# Patient Record
Sex: FEMALE | Race: WHITE | URBAN - METROPOLITAN AREA
[De-identification: names, ages, dates, MRNs, and addresses within clinical notes are randomized per-mention and may not be internally consistent; named-entity substitution may affect disease eponyms.]

---
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
## 2023-05-20 ENCOUNTER — EMERGENCY (EMERGENCY)
Facility: HOSPITAL | Age: 18
LOS: 0 days | Discharge: HOME | End: 2023-05-20
Attending: EMERGENCY MEDICINE
Payer: MEDICAID

## 2023-05-20 VITALS
RESPIRATION RATE: 18 BRPM | SYSTOLIC BLOOD PRESSURE: 114 MMHG | OXYGEN SATURATION: 99 % | HEART RATE: 90 BPM | TEMPERATURE: 99 F | DIASTOLIC BLOOD PRESSURE: 68 MMHG

## 2023-05-20 VITALS
RESPIRATION RATE: 16 BRPM | DIASTOLIC BLOOD PRESSURE: 70 MMHG | HEART RATE: 66 BPM | SYSTOLIC BLOOD PRESSURE: 107 MMHG | OXYGEN SATURATION: 99 % | TEMPERATURE: 96 F

## 2023-05-20 DIAGNOSIS — R11.0 NAUSEA: ICD-10-CM

## 2023-05-20 DIAGNOSIS — R10.9 UNSPECIFIED ABDOMINAL PAIN: ICD-10-CM

## 2023-05-20 DIAGNOSIS — Z87.42 PERSONAL HISTORY OF OTHER DISEASES OF THE FEMALE GENITAL TRACT: ICD-10-CM

## 2023-05-20 DIAGNOSIS — N83.201 UNSPECIFIED OVARIAN CYST, RIGHT SIDE: ICD-10-CM

## 2023-05-20 LAB
ALBUMIN SERPL ELPH-MCNC: 4.3 G/DL — SIGNIFICANT CHANGE UP (ref 3.5–5.2)
ALP SERPL-CCNC: 59 U/L — SIGNIFICANT CHANGE UP (ref 30–115)
ALT FLD-CCNC: 10 U/L — LOW (ref 14–37)
ANION GAP SERPL CALC-SCNC: 10 MMOL/L — SIGNIFICANT CHANGE UP (ref 7–14)
APPEARANCE UR: CLEAR — SIGNIFICANT CHANGE UP
APTT BLD: 36.7 SEC — SIGNIFICANT CHANGE UP (ref 27–39.2)
AST SERPL-CCNC: 15 U/L — SIGNIFICANT CHANGE UP (ref 14–37)
BASOPHILS # BLD AUTO: 0.03 K/UL — SIGNIFICANT CHANGE UP (ref 0–0.2)
BASOPHILS NFR BLD AUTO: 0.3 % — SIGNIFICANT CHANGE UP (ref 0–1)
BILIRUB SERPL-MCNC: <0.2 MG/DL — SIGNIFICANT CHANGE UP (ref 0.2–1.2)
BILIRUB UR-MCNC: NEGATIVE — SIGNIFICANT CHANGE UP
BUN SERPL-MCNC: 8 MG/DL — LOW (ref 10–20)
CALCIUM SERPL-MCNC: 10.1 MG/DL — SIGNIFICANT CHANGE UP (ref 8.4–10.5)
CHLORIDE SERPL-SCNC: 103 MMOL/L — SIGNIFICANT CHANGE UP (ref 98–110)
CO2 SERPL-SCNC: 25 MMOL/L — SIGNIFICANT CHANGE UP (ref 17–32)
COLOR SPEC: YELLOW — SIGNIFICANT CHANGE UP
CREAT SERPL-MCNC: 0.7 MG/DL — SIGNIFICANT CHANGE UP (ref 0.7–1.5)
DIFF PNL FLD: ABNORMAL
EGFR: 127 ML/MIN/1.73M2 — SIGNIFICANT CHANGE UP
EOSINOPHIL # BLD AUTO: 0.09 K/UL — SIGNIFICANT CHANGE UP (ref 0–0.7)
EOSINOPHIL NFR BLD AUTO: 1 % — SIGNIFICANT CHANGE UP (ref 0–8)
GLUCOSE SERPL-MCNC: 97 MG/DL — SIGNIFICANT CHANGE UP (ref 70–99)
GLUCOSE UR QL: NEGATIVE MG/DL — SIGNIFICANT CHANGE UP
HCG SERPL QL: NEGATIVE — SIGNIFICANT CHANGE UP
HCT VFR BLD CALC: 38.4 % — SIGNIFICANT CHANGE UP (ref 37–47)
HGB BLD-MCNC: 12.7 G/DL — SIGNIFICANT CHANGE UP (ref 12–16)
IMM GRANULOCYTES NFR BLD AUTO: 0.4 % — HIGH (ref 0.1–0.3)
INR BLD: 0.89 RATIO — SIGNIFICANT CHANGE UP (ref 0.65–1.3)
KETONES UR-MCNC: NEGATIVE — SIGNIFICANT CHANGE UP
LEUKOCYTE ESTERASE UR-ACNC: NEGATIVE — SIGNIFICANT CHANGE UP
LIDOCAIN IGE QN: 16 U/L — SIGNIFICANT CHANGE UP (ref 7–60)
LYMPHOCYTES # BLD AUTO: 2.82 K/UL — SIGNIFICANT CHANGE UP (ref 1.2–3.4)
LYMPHOCYTES # BLD AUTO: 30.9 % — SIGNIFICANT CHANGE UP (ref 20.5–51.1)
MCHC RBC-ENTMCNC: 28.7 PG — SIGNIFICANT CHANGE UP (ref 27–31)
MCHC RBC-ENTMCNC: 33.1 G/DL — SIGNIFICANT CHANGE UP (ref 32–37)
MCV RBC AUTO: 86.7 FL — SIGNIFICANT CHANGE UP (ref 81–99)
MONOCYTES # BLD AUTO: 0.94 K/UL — HIGH (ref 0.1–0.6)
MONOCYTES NFR BLD AUTO: 10.3 % — HIGH (ref 1.7–9.3)
NEUTROPHILS # BLD AUTO: 5.2 K/UL — SIGNIFICANT CHANGE UP (ref 1.4–6.5)
NEUTROPHILS NFR BLD AUTO: 57.1 % — SIGNIFICANT CHANGE UP (ref 42.2–75.2)
NITRITE UR-MCNC: NEGATIVE — SIGNIFICANT CHANGE UP
NRBC # BLD: 0 /100 WBCS — SIGNIFICANT CHANGE UP (ref 0–0)
PH UR: 6 — SIGNIFICANT CHANGE UP (ref 5–8)
PLATELET # BLD AUTO: 352 K/UL — SIGNIFICANT CHANGE UP (ref 130–400)
PMV BLD: 9.8 FL — SIGNIFICANT CHANGE UP (ref 7.4–10.4)
POTASSIUM SERPL-MCNC: 4.4 MMOL/L — SIGNIFICANT CHANGE UP (ref 3.5–5)
POTASSIUM SERPL-SCNC: 4.4 MMOL/L — SIGNIFICANT CHANGE UP (ref 3.5–5)
PROT SERPL-MCNC: 6.9 G/DL — SIGNIFICANT CHANGE UP (ref 6–8)
PROT UR-MCNC: NEGATIVE MG/DL — SIGNIFICANT CHANGE UP
PROTHROM AB SERPL-ACNC: 10.1 SEC — SIGNIFICANT CHANGE UP (ref 9.95–12.87)
RBC # BLD: 4.43 M/UL — SIGNIFICANT CHANGE UP (ref 4.2–5.4)
RBC # FLD: 12.7 % — SIGNIFICANT CHANGE UP (ref 11.5–14.5)
SODIUM SERPL-SCNC: 138 MMOL/L — SIGNIFICANT CHANGE UP (ref 135–146)
SP GR SPEC: 1.02 — SIGNIFICANT CHANGE UP (ref 1.01–1.03)
UROBILINOGEN FLD QL: 0.2 MG/DL — SIGNIFICANT CHANGE UP
WBC # BLD: 9.12 K/UL — SIGNIFICANT CHANGE UP (ref 4.8–10.8)
WBC # FLD AUTO: 9.12 K/UL — SIGNIFICANT CHANGE UP (ref 4.8–10.8)

## 2023-05-20 PROCEDURE — 85730 THROMBOPLASTIN TIME PARTIAL: CPT

## 2023-05-20 PROCEDURE — 76856 US EXAM PELVIC COMPLETE: CPT | Mod: 26

## 2023-05-20 PROCEDURE — 86901 BLOOD TYPING SEROLOGIC RH(D): CPT

## 2023-05-20 PROCEDURE — 87491 CHLMYD TRACH DNA AMP PROBE: CPT

## 2023-05-20 PROCEDURE — 85025 COMPLETE CBC W/AUTO DIFF WBC: CPT

## 2023-05-20 PROCEDURE — 81001 URINALYSIS AUTO W/SCOPE: CPT

## 2023-05-20 PROCEDURE — 87591 N.GONORRHOEAE DNA AMP PROB: CPT

## 2023-05-20 PROCEDURE — 76856 US EXAM PELVIC COMPLETE: CPT

## 2023-05-20 PROCEDURE — 74177 CT ABD & PELVIS W/CONTRAST: CPT | Mod: MA

## 2023-05-20 PROCEDURE — 99285 EMERGENCY DEPT VISIT HI MDM: CPT

## 2023-05-20 PROCEDURE — 86850 RBC ANTIBODY SCREEN: CPT

## 2023-05-20 PROCEDURE — 85610 PROTHROMBIN TIME: CPT

## 2023-05-20 PROCEDURE — 87086 URINE CULTURE/COLONY COUNT: CPT

## 2023-05-20 PROCEDURE — 83690 ASSAY OF LIPASE: CPT

## 2023-05-20 PROCEDURE — 84703 CHORIONIC GONADOTROPIN ASSAY: CPT

## 2023-05-20 PROCEDURE — 36415 COLL VENOUS BLD VENIPUNCTURE: CPT

## 2023-05-20 PROCEDURE — 80053 COMPREHEN METABOLIC PANEL: CPT

## 2023-05-20 PROCEDURE — 99284 EMERGENCY DEPT VISIT MOD MDM: CPT | Mod: 25

## 2023-05-20 PROCEDURE — 96376 TX/PRO/DX INJ SAME DRUG ADON: CPT

## 2023-05-20 PROCEDURE — 96374 THER/PROPH/DIAG INJ IV PUSH: CPT | Mod: XU

## 2023-05-20 PROCEDURE — 96375 TX/PRO/DX INJ NEW DRUG ADDON: CPT

## 2023-05-20 PROCEDURE — 86900 BLOOD TYPING SEROLOGIC ABO: CPT

## 2023-05-20 PROCEDURE — 74177 CT ABD & PELVIS W/CONTRAST: CPT | Mod: 26,MA

## 2023-05-20 RX ORDER — MORPHINE SULFATE 50 MG/1
4 CAPSULE, EXTENDED RELEASE ORAL ONCE
Refills: 0 | Status: COMPLETED | OUTPATIENT
Start: 2023-05-20 | End: 2023-05-20

## 2023-05-20 RX ORDER — MORPHINE SULFATE 50 MG/1
4 CAPSULE, EXTENDED RELEASE ORAL ONCE
Refills: 0 | Status: DISCONTINUED | OUTPATIENT
Start: 2023-05-20 | End: 2023-05-20

## 2023-05-20 RX ORDER — KETOROLAC TROMETHAMINE 30 MG/ML
30 SYRINGE (ML) INJECTION ONCE
Refills: 0 | Status: DISCONTINUED | OUTPATIENT
Start: 2023-05-20 | End: 2023-05-20

## 2023-05-20 RX ORDER — ONDANSETRON 8 MG/1
4 TABLET, FILM COATED ORAL ONCE
Refills: 0 | Status: COMPLETED | OUTPATIENT
Start: 2023-05-20 | End: 2023-05-20

## 2023-05-20 RX ORDER — FAMOTIDINE 10 MG/ML
20 INJECTION INTRAVENOUS ONCE
Refills: 0 | Status: COMPLETED | OUTPATIENT
Start: 2023-05-20 | End: 2023-05-20

## 2023-05-20 RX ORDER — SODIUM CHLORIDE 9 MG/ML
1000 INJECTION INTRAMUSCULAR; INTRAVENOUS; SUBCUTANEOUS ONCE
Refills: 0 | Status: COMPLETED | OUTPATIENT
Start: 2023-05-20 | End: 2023-05-20

## 2023-05-20 RX ORDER — HYDROMORPHONE HYDROCHLORIDE 2 MG/ML
0.5 INJECTION INTRAMUSCULAR; INTRAVENOUS; SUBCUTANEOUS ONCE
Refills: 0 | Status: DISCONTINUED | OUTPATIENT
Start: 2023-05-20 | End: 2023-05-20

## 2023-05-20 RX ORDER — DIATRIZOATE MEGLUMINE 180 MG/ML
30 INJECTION, SOLUTION INTRAVESICAL ONCE
Refills: 0 | Status: COMPLETED | OUTPATIENT
Start: 2023-05-20 | End: 2023-05-20

## 2023-05-20 RX ADMIN — ONDANSETRON 4 MILLIGRAM(S): 8 TABLET, FILM COATED ORAL at 01:54

## 2023-05-20 RX ADMIN — Medication 30 MILLIGRAM(S): at 05:32

## 2023-05-20 RX ADMIN — MORPHINE SULFATE 4 MILLIGRAM(S): 50 CAPSULE, EXTENDED RELEASE ORAL at 04:36

## 2023-05-20 RX ADMIN — SODIUM CHLORIDE 2000 MILLILITER(S): 9 INJECTION INTRAMUSCULAR; INTRAVENOUS; SUBCUTANEOUS at 01:54

## 2023-05-20 RX ADMIN — MORPHINE SULFATE 4 MILLIGRAM(S): 50 CAPSULE, EXTENDED RELEASE ORAL at 07:37

## 2023-05-20 RX ADMIN — HYDROMORPHONE HYDROCHLORIDE 0.5 MILLIGRAM(S): 2 INJECTION INTRAMUSCULAR; INTRAVENOUS; SUBCUTANEOUS at 10:10

## 2023-05-20 RX ADMIN — FAMOTIDINE 20 MILLIGRAM(S): 10 INJECTION INTRAVENOUS at 01:54

## 2023-05-20 RX ADMIN — DIATRIZOATE MEGLUMINE 30 MILLILITER(S): 180 INJECTION, SOLUTION INTRAVESICAL at 01:54

## 2023-05-20 RX ADMIN — MORPHINE SULFATE 4 MILLIGRAM(S): 50 CAPSULE, EXTENDED RELEASE ORAL at 08:30

## 2023-05-20 RX ADMIN — HYDROMORPHONE HYDROCHLORIDE 0.5 MILLIGRAM(S): 2 INJECTION INTRAMUSCULAR; INTRAVENOUS; SUBCUTANEOUS at 09:45

## 2023-05-20 NOTE — ED PROVIDER NOTE - CONSIDERATION OF ADMISSION OBSERVATION
Consideration of Admission/Observation Pt with pain and we considered admission, however the pain was improving and pt was sent home.

## 2023-05-20 NOTE — ED PROVIDER NOTE - ATTENDING APP SHARED VISIT CONTRIBUTION OF CARE
Patient presents with abdominal pain that is diffuse associate with nausea no vomiting.  LMP was the beginning of the month.  Last sexual intercourse was 15 days ago.  Had some vaginal spotting today only with wiping.  Denies any fevers.  Denies any vaginal discharge.  On exam she is tender diffusely in the abdomen without any rebound or guarding.  The tenderness is mild.  Patient notes that she was admitted to our [facility] this year for possible mesenteric adenitis.

## 2023-05-20 NOTE — ED PROVIDER NOTE - OBJECTIVE STATEMENT
18-year-old female with past medical history of ovarian cyst who presented to ED with abdominal pain.  Reports that symptoms started earlier tonight; pain is in the entire abdominal area, dullness, radiating to her back, constant, and there is no alleviating or worsening factor.  Also endorses nausea, but no vomiting.  Denies fever, shortness breath, chest pain, vomiting, hematuria, dysuria, melena, hematochezia, and vaginal discharge.

## 2023-05-20 NOTE — ED ADULT NURSE NOTE - NSFALLUNIVINTERV_ED_ALL_ED
Bed/Stretcher in lowest position, wheels locked, appropriate side rails in place/Call bell, personal items and telephone in reach/Instruct patient to call for assistance before getting out of bed/chair/stretcher/Non-slip footwear applied when patient is off stretcher/[place] to call system/Physically safe environment - no spills, clutter or unnecessary equipment/Purposeful proactive rounding/Room/bathroom lighting operational, light cord in reach

## 2023-05-20 NOTE — ED PROVIDER NOTE - PHYSICAL EXAMINATION
CONSTITUTIONAL: in no apparent distress.   HEAD: Normocephalic; atraumatic.   EYES: Pupils are round and reactive, extra-ocular muscles are intact. Eyelids are normal in appearance without swelling or lesions.   ENT: Hearing is intact with good acuity to spoken voice.  Patient is speaking clearly, not muffled and airway is intact.   RESPIRATORY: No signs of respiratory distress. Lung sounds are clear in all lobes bilaterally without rales, rhonchi, or wheezes.  CARDIOVASCULAR: Regular rate and rhythm.   GI: tender to palpation in the abd area. Abdomen is soft, and without distention. Bowel sounds are present and normoactive in all four quadrants. No masses are noted.   BACK: No evidence of trauma or deformity. No CVA tenderness bilaterally. Normal ROM.   NEURO: A & O x 3. Normal speech. No focal deficit.  PSYCHOLOGICAL: Appropriate mood and affect. Good judgement and insight. CONSTITUTIONAL: in no apparent distress.   HEAD: Normocephalic; atraumatic.   EYES: Pupils are round and reactive, extra-ocular muscles are intact. Eyelids are normal in appearance without swelling or lesions.   ENT: Hearing is intact with good acuity to spoken voice.  Patient is speaking clearly, not muffled and airway is intact.   RESPIRATORY: No signs of respiratory distress. Lung sounds are clear in all lobes bilaterally without rales, rhonchi, or wheezes.  CARDIOVASCULAR: Regular rate and rhythm.   GI: tender to palpation in the abd area. Abdomen is soft, and without distention. Bowel sounds are present and normoactive in all four quadrants. No masses are noted.   PELVIC: Chaperone: PCA Celestina. External genitalia without erythema, lesion, or mass. No vaginal discharge. Dry blood noticed in the vaginal canal. No adnexal masses or tenderness. Cervix is non-tender without lesion.  BACK: No evidence of trauma or deformity. No CVA tenderness bilaterally. Normal ROM.   NEURO: A & O x 3. Normal speech. No focal deficit.  PSYCHOLOGICAL: Appropriate mood and affect. Good judgement and insight.

## 2023-05-20 NOTE — ED PROVIDER NOTE - CLINICAL SUMMARY MEDICAL DECISION MAKING FREE TEXT BOX
Pt presents with abdominal pain that started suddenly. CT scan and US Pelvis found a hemorraghic cyst. While her pt with pain requiring Morphine and Dilaudid. Felt better after Dilaudid.

## 2023-05-20 NOTE — ED PROVIDER NOTE - PROGRESS NOTE DETAILS
Pt accepted from DR. Velasquez. PT seen and examined. reports diffuse abdominal pain that started 11PM while pt was laying in bed. Hx of ovarian cyst 2 cm about one year ago. NO associated nausea, vomiting or diarrhea. Since onset the pain is slightly better. CT scan found 4 cm ovarian cyst. PT is awaiting US of Pelvis. Pt has gotten 8 mg of morphine and toradol 30 mg. Comfortable. No abdominal tenderness. Patient stating she still has pain, another dose of morphine ordered, awaiting transvaginal sono p feeling better, will dc with GYN f/u, pt agreeable with plan

## 2023-05-20 NOTE — ED PROVIDER NOTE - PATIENT PORTAL LINK FT
You can access the FollowMyHealth Patient Portal offered by [facility] by registering at the following website: [email or web address]. By joining [employer]’s FollowMyHealth portal, you will also be able to view your health information using other applications (apps) compatible with our system.

## 2023-05-22 LAB
C TRACH RRNA SPEC QL NAA+PROBE: SIGNIFICANT CHANGE UP
CULTURE RESULTS: SIGNIFICANT CHANGE UP
N GONORRHOEA RRNA SPEC QL NAA+PROBE: SIGNIFICANT CHANGE UP
SPECIMEN SOURCE: SIGNIFICANT CHANGE UP
SPECIMEN SOURCE: SIGNIFICANT CHANGE UP

## 2023-08-25 ENCOUNTER — NON-APPOINTMENT (OUTPATIENT)
Age: 18
End: 2023-08-25

## 2023-09-08 ENCOUNTER — INPATIENT (INPATIENT)
Facility: HOSPITAL | Age: 18
LOS: 4 days | Discharge: HOME | DRG: 241 | End: 2023-09-13
Attending: HOSPITALIST | Admitting: HOSPITALIST
Payer: MEDICAID

## 2023-09-08 ENCOUNTER — TRANSCRIPTION ENCOUNTER (OUTPATIENT)
Age: 18
End: 2023-09-08

## 2023-09-08 VITALS
HEART RATE: 96 BPM | RESPIRATION RATE: 18 BRPM | OXYGEN SATURATION: 99 % | TEMPERATURE: 97 F | WEIGHT: 160.06 LBS | DIASTOLIC BLOOD PRESSURE: 81 MMHG | SYSTOLIC BLOOD PRESSURE: 123 MMHG

## 2023-09-08 DIAGNOSIS — K52.839 MICROSCOPIC COLITIS, UNSPECIFIED: ICD-10-CM

## 2023-09-08 PROBLEM — Z87.42 PERSONAL HISTORY OF OTHER DISEASES OF THE FEMALE GENITAL TRACT: Chronic | Status: ACTIVE | Noted: 2023-05-20

## 2023-09-08 LAB
ALBUMIN SERPL ELPH-MCNC: 4.4 G/DL — SIGNIFICANT CHANGE UP (ref 3.5–5.2)
ALP SERPL-CCNC: 54 U/L — SIGNIFICANT CHANGE UP (ref 30–115)
ALT FLD-CCNC: 11 U/L — LOW (ref 14–37)
ANION GAP SERPL CALC-SCNC: 11 MMOL/L — SIGNIFICANT CHANGE UP (ref 7–14)
APPEARANCE UR: CLEAR — SIGNIFICANT CHANGE UP
AST SERPL-CCNC: 17 U/L — SIGNIFICANT CHANGE UP (ref 14–37)
BASOPHILS # BLD AUTO: 0.02 K/UL — SIGNIFICANT CHANGE UP (ref 0–0.2)
BASOPHILS NFR BLD AUTO: 0.3 % — SIGNIFICANT CHANGE UP (ref 0–1)
BILIRUB DIRECT SERPL-MCNC: <0.2 MG/DL — SIGNIFICANT CHANGE UP (ref 0–0.3)
BILIRUB INDIRECT FLD-MCNC: >0.1 MG/DL — LOW (ref 0.2–1.2)
BILIRUB SERPL-MCNC: 0.3 MG/DL — SIGNIFICANT CHANGE UP (ref 0.2–1.2)
BILIRUB UR-MCNC: NEGATIVE — SIGNIFICANT CHANGE UP
BUN SERPL-MCNC: 5 MG/DL — LOW (ref 10–20)
CALCIUM SERPL-MCNC: 9.3 MG/DL — SIGNIFICANT CHANGE UP (ref 8.4–10.5)
CHLORIDE SERPL-SCNC: 103 MMOL/L — SIGNIFICANT CHANGE UP (ref 98–110)
CO2 SERPL-SCNC: 24 MMOL/L — SIGNIFICANT CHANGE UP (ref 17–32)
COLOR SPEC: YELLOW — SIGNIFICANT CHANGE UP
CREAT SERPL-MCNC: 0.7 MG/DL — SIGNIFICANT CHANGE UP (ref 0.7–1.5)
DIFF PNL FLD: ABNORMAL
EGFR: 127 ML/MIN/1.73M2 — SIGNIFICANT CHANGE UP
EOSINOPHIL # BLD AUTO: 0.03 K/UL — SIGNIFICANT CHANGE UP (ref 0–0.7)
EOSINOPHIL NFR BLD AUTO: 0.4 % — SIGNIFICANT CHANGE UP (ref 0–8)
GLUCOSE SERPL-MCNC: 88 MG/DL — SIGNIFICANT CHANGE UP (ref 70–99)
GLUCOSE UR QL: NEGATIVE MG/DL — SIGNIFICANT CHANGE UP
HCG SERPL-ACNC: <1 MIU/ML — SIGNIFICANT CHANGE UP
HCT VFR BLD CALC: 39.1 % — SIGNIFICANT CHANGE UP (ref 37–47)
HGB BLD-MCNC: 12.8 G/DL — SIGNIFICANT CHANGE UP (ref 12–16)
IMM GRANULOCYTES NFR BLD AUTO: 0.3 % — SIGNIFICANT CHANGE UP (ref 0.1–0.3)
KETONES UR-MCNC: NEGATIVE MG/DL — SIGNIFICANT CHANGE UP
LEUKOCYTE ESTERASE UR-ACNC: ABNORMAL
LIDOCAIN IGE QN: 14 U/L — SIGNIFICANT CHANGE UP (ref 7–60)
LYMPHOCYTES # BLD AUTO: 2.69 K/UL — SIGNIFICANT CHANGE UP (ref 1.2–3.4)
LYMPHOCYTES # BLD AUTO: 34.9 % — SIGNIFICANT CHANGE UP (ref 20.5–51.1)
MCHC RBC-ENTMCNC: 28 PG — SIGNIFICANT CHANGE UP (ref 27–31)
MCHC RBC-ENTMCNC: 32.7 G/DL — SIGNIFICANT CHANGE UP (ref 32–37)
MCV RBC AUTO: 85.6 FL — SIGNIFICANT CHANGE UP (ref 81–99)
MONOCYTES # BLD AUTO: 0.61 K/UL — HIGH (ref 0.1–0.6)
MONOCYTES NFR BLD AUTO: 7.9 % — SIGNIFICANT CHANGE UP (ref 1.7–9.3)
NEUTROPHILS # BLD AUTO: 4.34 K/UL — SIGNIFICANT CHANGE UP (ref 1.4–6.5)
NEUTROPHILS NFR BLD AUTO: 56.2 % — SIGNIFICANT CHANGE UP (ref 42.2–75.2)
NITRITE UR-MCNC: NEGATIVE — SIGNIFICANT CHANGE UP
NRBC # BLD: 0 /100 WBCS — SIGNIFICANT CHANGE UP (ref 0–0)
PH UR: 6.5 — SIGNIFICANT CHANGE UP (ref 5–8)
PLATELET # BLD AUTO: 357 K/UL — SIGNIFICANT CHANGE UP (ref 130–400)
PMV BLD: 9.7 FL — SIGNIFICANT CHANGE UP (ref 7.4–10.4)
POTASSIUM SERPL-MCNC: 4.1 MMOL/L — SIGNIFICANT CHANGE UP (ref 3.5–5)
POTASSIUM SERPL-SCNC: 4.1 MMOL/L — SIGNIFICANT CHANGE UP (ref 3.5–5)
PROT SERPL-MCNC: 6.9 G/DL — SIGNIFICANT CHANGE UP (ref 6–8)
PROT UR-MCNC: NEGATIVE MG/DL — SIGNIFICANT CHANGE UP
RBC # BLD: 4.57 M/UL — SIGNIFICANT CHANGE UP (ref 4.2–5.4)
RBC # FLD: 12.9 % — SIGNIFICANT CHANGE UP (ref 11.5–14.5)
SODIUM SERPL-SCNC: 138 MMOL/L — SIGNIFICANT CHANGE UP (ref 135–146)
SP GR SPEC: 1 — SIGNIFICANT CHANGE UP (ref 1–1.03)
UROBILINOGEN FLD QL: 0.2 MG/DL — SIGNIFICANT CHANGE UP (ref 0.2–1)
WBC # BLD: 7.71 K/UL — SIGNIFICANT CHANGE UP (ref 4.8–10.8)
WBC # FLD AUTO: 7.71 K/UL — SIGNIFICANT CHANGE UP (ref 4.8–10.8)

## 2023-09-08 PROCEDURE — 87177 OVA AND PARASITES SMEARS: CPT

## 2023-09-08 PROCEDURE — 74177 CT ABD & PELVIS W/CONTRAST: CPT | Mod: 26,MA

## 2023-09-08 PROCEDURE — 86140 C-REACTIVE PROTEIN: CPT

## 2023-09-08 PROCEDURE — 87046 STOOL CULTR AEROBIC BACT EA: CPT

## 2023-09-08 PROCEDURE — 88312 SPECIAL STAINS GROUP 1: CPT

## 2023-09-08 PROCEDURE — 88305 TISSUE EXAM BY PATHOLOGIST: CPT

## 2023-09-08 PROCEDURE — 81025 URINE PREGNANCY TEST: CPT

## 2023-09-08 PROCEDURE — 83993 ASSAY FOR CALPROTECTIN FECAL: CPT

## 2023-09-08 PROCEDURE — 87045 FECES CULTURE AEROBIC BACT: CPT

## 2023-09-08 PROCEDURE — 87507 IADNA-DNA/RNA PROBE TQ 12-25: CPT

## 2023-09-08 PROCEDURE — 36415 COLL VENOUS BLD VENIPUNCTURE: CPT

## 2023-09-08 PROCEDURE — 85652 RBC SED RATE AUTOMATED: CPT

## 2023-09-08 PROCEDURE — 99285 EMERGENCY DEPT VISIT HI MDM: CPT

## 2023-09-08 PROCEDURE — 82270 OCCULT BLOOD FECES: CPT

## 2023-09-08 RX ORDER — SODIUM CHLORIDE 9 MG/ML
1000 INJECTION, SOLUTION INTRAVENOUS ONCE
Refills: 0 | Status: COMPLETED | OUTPATIENT
Start: 2023-09-08 | End: 2023-09-08

## 2023-09-08 RX ORDER — MORPHINE SULFATE 50 MG/1
4 CAPSULE, EXTENDED RELEASE ORAL EVERY 4 HOURS
Refills: 0 | Status: DISCONTINUED | OUTPATIENT
Start: 2023-09-08 | End: 2023-09-08

## 2023-09-08 RX ORDER — MORPHINE SULFATE 50 MG/1
4 CAPSULE, EXTENDED RELEASE ORAL ONCE
Refills: 0 | Status: DISCONTINUED | OUTPATIENT
Start: 2023-09-08 | End: 2023-09-08

## 2023-09-08 RX ORDER — SODIUM CHLORIDE 9 MG/ML
1000 INJECTION, SOLUTION INTRAVENOUS
Refills: 0 | Status: DISCONTINUED | OUTPATIENT
Start: 2023-09-08 | End: 2023-09-13

## 2023-09-08 RX ORDER — ONDANSETRON 8 MG/1
4 TABLET, FILM COATED ORAL EVERY 8 HOURS
Refills: 0 | Status: DISCONTINUED | OUTPATIENT
Start: 2023-09-08 | End: 2023-09-13

## 2023-09-08 RX ORDER — FAMOTIDINE 10 MG/ML
20 INJECTION INTRAVENOUS ONCE
Refills: 0 | Status: COMPLETED | OUTPATIENT
Start: 2023-09-08 | End: 2023-09-08

## 2023-09-08 RX ORDER — MORPHINE SULFATE 50 MG/1
6 CAPSULE, EXTENDED RELEASE ORAL ONCE
Refills: 0 | Status: DISCONTINUED | OUTPATIENT
Start: 2023-09-08 | End: 2023-09-08

## 2023-09-08 RX ORDER — ONDANSETRON 8 MG/1
4 TABLET, FILM COATED ORAL ONCE
Refills: 0 | Status: COMPLETED | OUTPATIENT
Start: 2023-09-08 | End: 2023-09-08

## 2023-09-08 RX ORDER — ACETAMINOPHEN 500 MG
650 TABLET ORAL ONCE
Refills: 0 | Status: COMPLETED | OUTPATIENT
Start: 2023-09-08 | End: 2023-09-08

## 2023-09-08 RX ORDER — IOHEXOL 300 MG/ML
30 INJECTION, SOLUTION INTRAVENOUS ONCE
Refills: 0 | Status: COMPLETED | OUTPATIENT
Start: 2023-09-08 | End: 2023-09-08

## 2023-09-08 RX ORDER — MORPHINE SULFATE 50 MG/1
6 CAPSULE, EXTENDED RELEASE ORAL EVERY 4 HOURS
Refills: 0 | Status: DISCONTINUED | OUTPATIENT
Start: 2023-09-08 | End: 2023-09-13

## 2023-09-08 RX ORDER — ACETAMINOPHEN 500 MG
650 TABLET ORAL EVERY 6 HOURS
Refills: 0 | Status: DISCONTINUED | OUTPATIENT
Start: 2023-09-08 | End: 2023-09-11

## 2023-09-08 RX ORDER — DIPHENHYDRAMINE HYDROCHLORIDE AND LIDOCAINE HYDROCHLORIDE AND ALUMINUM HYDROXIDE AND MAGNESIUM HYDRO
30 KIT ONCE
Refills: 0 | Status: COMPLETED | OUTPATIENT
Start: 2023-09-08 | End: 2023-09-08

## 2023-09-08 RX ADMIN — SODIUM CHLORIDE 1000 MILLILITER(S): 9 INJECTION, SOLUTION INTRAVENOUS at 08:54

## 2023-09-08 RX ADMIN — SODIUM CHLORIDE 100 MILLILITER(S): 9 INJECTION, SOLUTION INTRAVENOUS at 22:47

## 2023-09-08 RX ADMIN — Medication 650 MILLIGRAM(S): at 10:06

## 2023-09-08 RX ADMIN — DIPHENHYDRAMINE HYDROCHLORIDE AND LIDOCAINE HYDROCHLORIDE AND ALUMINUM HYDROXIDE AND MAGNESIUM HYDRO 30 MILLILITER(S): KIT at 09:16

## 2023-09-08 RX ADMIN — ONDANSETRON 4 MILLIGRAM(S): 8 TABLET, FILM COATED ORAL at 22:28

## 2023-09-08 RX ADMIN — MORPHINE SULFATE 4 MILLIGRAM(S): 50 CAPSULE, EXTENDED RELEASE ORAL at 11:03

## 2023-09-08 RX ADMIN — Medication 650 MILLIGRAM(S): at 23:26

## 2023-09-08 RX ADMIN — ONDANSETRON 4 MILLIGRAM(S): 8 TABLET, FILM COATED ORAL at 09:54

## 2023-09-08 RX ADMIN — MORPHINE SULFATE 4 MILLIGRAM(S): 50 CAPSULE, EXTENDED RELEASE ORAL at 18:33

## 2023-09-08 RX ADMIN — MORPHINE SULFATE 6 MILLIGRAM(S): 50 CAPSULE, EXTENDED RELEASE ORAL at 23:16

## 2023-09-08 RX ADMIN — IOHEXOL 30 MILLILITER(S): 300 INJECTION, SOLUTION INTRAVENOUS at 11:03

## 2023-09-08 RX ADMIN — MORPHINE SULFATE 6 MILLIGRAM(S): 50 CAPSULE, EXTENDED RELEASE ORAL at 23:14

## 2023-09-08 RX ADMIN — SODIUM CHLORIDE 100 MILLILITER(S): 9 INJECTION, SOLUTION INTRAVENOUS at 18:20

## 2023-09-08 RX ADMIN — Medication 650 MILLIGRAM(S): at 23:22

## 2023-09-08 RX ADMIN — FAMOTIDINE 20 MILLIGRAM(S): 10 INJECTION INTRAVENOUS at 08:54

## 2023-09-08 RX ADMIN — MORPHINE SULFATE 6 MILLIGRAM(S): 50 CAPSULE, EXTENDED RELEASE ORAL at 12:46

## 2023-09-08 NOTE — ED PROVIDER NOTE - ATTENDING CONTRIBUTION TO CARE
18-year-old female presenting for evaluation of epigastric pain associated with nausea vomiting since yesterday 1 PM.  Last meal at 1 PM yesterday, patient had waffles.  Symptoms similar to prior episodes, patient was hospitalized in February for similar complaints at [facility], was diagnosed with gastritis.  At that time had labs and reportedly CT abdomen pelvis.  Her PCP prescribed her Pepcid and omeprazole, however, she takes it only occasionally, last use 2 weeks ago.  Denies any excessive alcohol or any NSAID use. Patient reports that she saw blood streaks in her emesis, and had small amount of blood on tissue paper after having a bowel movement yesterday.  Denies any pain in her rectum, no excessive vaginal bleeding, no dysuria/hematuria urgency or frequency.  Denies any dizziness lightheadedness, chest pain or shortness of breath, flank pain, focal weakness or paresthesias. Patient has a gastroenterology appointment in 3 days.  Well-appearing well-nourished, NAD, head AT/NC, PERRL, pink conjunctivae,  mmm, nml oropharynx, nml phonation without drooling or trismus, supple neck , nml work of breathing, lungs CTA b/l, equal air entry, RRR, well-perfused extremities, distal pulses intact, abdomen soft, epigastric TTP without rebound or guarding, ND, BS present in all quadrants, MOISES done by me and chaperoned by Dr. Jamison is negative for hemorrhoids/fissures/no blood on glove, no tenderness on exam,no midline spine or CVA ttp, no leg edema or unilateral calf swelling, A&Ox3, no focal neuro deficits, nml mood and affect.  Plan: Antiemetics, Pepcid, hydration, labs, reassess.

## 2023-09-08 NOTE — DISCHARGE NOTE PROVIDER - PROVIDER TOKENS
PROVIDER:[TOKEN:[[id number]],FOLLOWUP:[1-3 days]] PROVIDER:[TOKEN:[[id number]],FOLLOWUP:[1-3 days]],PROVIDER:[TOKEN:[[id number]],FOLLOWUP:[2 weeks]]

## 2023-09-08 NOTE — ED PROVIDER NOTE - OBJECTIVE STATEMENT
18 y F w/ a hx of right ovarian cyst and mesenteric ileitis presents for abdominal pain and bloody stools. Pt also states she experienced 4 bouts of bloody emesis. Since yesterday afternoon, she had an upset stomach and did not eat since, but has been able to tolerate water. Last night, she had sudden onset constant diffuse 8/10 abdominal pain for which she didn't take any medications, but movements and eating exacerbate the pain. She had similar experiences of abdominal pain this past February and April or May. She had to be hospitalized for mesenteric ileitis and was discharged with GI follow up, who she hadn't had the opportunity to see. Her appointment is this following Monday. Her PCP prescribed Pepcid and Omeprazole, which she takes intermittently when she has stomach problems. Last night she experienced 1 episode of blood after she wiped. A second bowel movement yielded no blood. Secondary to pain she felt nauseous and vomited liquid blood 1x, and afterwards bloody phlegm. She denies fever, chills, dizziness, lightheadedness, chest pain, shortness of breath, diarrhea, constipation, or urinary symptoms. 18 y F w/ a hx of right ovarian cyst, mesenteric ileitis and gastritis presents for abdominal pain and bloody stools. Pt also states she experienced 4 bouts of bloody emesis. Since yesterday afternoon, she had an upset stomach and did not eat since, but has been able to tolerate water. Last night, she had sudden onset constant diffuse 8/10 abdominal pain for which she didn't take any medications, but movements and eating exacerbate the pain. She had similar experiences of abdominal pain this past February and April or May. She had to be hospitalized for mesenteric ileitis and was discharged with GI follow up, who she hadn't had the opportunity to see. Her appointment is this following Monday. Her PCP prescribed Pepcid and Omeprazole for newly diagnosed gastritis, which she takes intermittently when she has stomach problems. Last night she experienced 1 episode of blood after she wiped. A second bowel movement yielded no blood. Secondary to pain she felt nauseous and vomited liquid blood 1x, and afterwards bloody phlegm. She denies fever, chills, dizziness, lightheadedness, chest pain, shortness of breath, diarrhea, constipation, or urinary symptoms.

## 2023-09-08 NOTE — DISCHARGE NOTE PROVIDER - CARE PROVIDERS DIRECT ADDRESSES
[email or web address]

## 2023-09-08 NOTE — H&P PEDIATRIC - ATTENDING COMMENTS
Patient examined on morning rounds and I performed my own assessment.     Gabi is a 18 you F admitted for recurrent episodes of abdominal pain, now with intermittent areas of ileitis/colitis on her CT, persistent pain and PO intolerance, elevated inflammatory markers (ESR, CRP) that is most concerning for an inflammatory process and presentation and CT findings can be consistent with inflammatory bowel disease. Symptoms have likely been further exacerbated by her use of Advil.     On exam, she is overall well-appearing but does appear to be in mild discomfort, pleasant and cooperative, MMM, clear oropharynx, PERRL, clear lungs and breathing comfortably, normal cardiac exam, no murmurs rubs or gallops; on abdominal exam she is very tender throughout, even to light palpation, worse in lower quadrants, no HSM, normaoactive BS. She has no bruises rashes or other skin lesions, pulses 2+ bilat, WWP, normal cap refill.     We will continue with IV hydration while PO remains poor, pain control with IV tylenol and morphine (no NSAIDs due to risk of GI irritation), and as per adult GI consult recommendations started Cipro/flagyl today. If little to no response, they will consider endoscopy for diagnosis in 2 days (monday). Dispo pending improvement in symptoms.    I have discussed plan of care with multidisciplinary team, patient and family. All questions addressed.

## 2023-09-08 NOTE — ED PEDIATRIC TRIAGE NOTE - BP NONINVASIVE SYSTOLIC (MM HG)
Spoke with Adelina in response to her portal message with c/o gas pain. The pt states that for the past few days her \"colon feels off\" and that if feels like there is a pain \"like a gas bubble\" that starts at her stomach and that travels to her rectum and that it is relieved when she passes gas, but that she struggles to pass gas. She reports soft and formed stool and regular bm. She denies blood at the rectum or in the stools. She states that she does not want to take gas X , as she took it before and \"did not like how it felt\". She states that she had this issue with her last pregnancy at that it was helped by seeing a physiotherapist and she is requesting a referral for this service again. I reviewed all of the above with DINA Leong and an order was placed for PT for possible pelvic prolapse. The phone number was provided for women's health rehab for the pt to schedule an appt.    [phone]

## 2023-09-08 NOTE — ED PROVIDER NOTE - DIFFERENTIAL DIAGNOSIS
Differential Diagnosis Gastritis, peptic ulcer disease, pancreatitis, enteritis, less likely cholecystitis or cholelithiasis given negative CT a few months ago

## 2023-09-08 NOTE — PATIENT PROFILE ADULT - FALL HARM RISK - UNIVERSAL INTERVENTIONS
Bed in lowest position, wheels locked, appropriate side rails in place/Call bell, personal items and telephone in reach/Instruct patient to call for assistance before getting out of bed or chair/Non-slip footwear when patient is out of bed/Leonidas to call system/Physically safe environment - no spills, clutter or unnecessary equipment/Purposeful Proactive Rounding/Room/bathroom lighting operational, light cord in reach

## 2023-09-08 NOTE — DISCHARGE NOTE PROVIDER - NSDCCPCAREPLAN_GEN_ALL_CORE_FT
PRINCIPAL DISCHARGE DIAGNOSIS  Diagnosis: Gastritis  Assessment and Plan of Treatment: Contact a health care provider if:  Your child's condition gets worse.  Your child loses weight or has no appetite.  Your child is nauseous and vomits.  Your child has a fever.  Your child has blood in his or her vomit or stool.  .  Get help right away if:  Your child vomits red blood or a substance that looks like coffee grounds.  Your child is light-headed or faints.  Your child has bright red or black and tarry stools.  Your child vomits repeatedly.  Your child has severe pain in his or her abdomen, or the abdomen is tender to the touch.  Your child has chest pain or shortness of breath.  Your child who is younger than 3 months has a temperature of 100.4°F (38°C) or higher.  Your child who is 3 months to 3 years old has a temperature of 102.2°F (39°C) or higher.  These symptoms may represent a serious problem that is an emergency. Do not wait to see if the symptoms will go away. Get medical help right away. Call your local emergency services (911 in the [place]      SECONDARY DISCHARGE DIAGNOSES  Diagnosis: Abdominal pain  Assessment and Plan of Treatment:      PRINCIPAL DISCHARGE DIAGNOSIS  Diagnosis: Gastritis  Assessment and Plan of Treatment: Discharge Plan:  - Follow up with pediatrician ( Dr. Castillo) in 1-3 days  - Follow up with gastroenterologist ( Dr. Montana) in 2-3 weeks. ( please call to book your appointment)  - Gastroenterologist to follow up on colonoscopy biopsy results   - Medication Instructions  - May take bentyl 1 tablet every 6 hours as needed for abdominal pain  - Take protonix 1 tablet by once daily for 11 days  > Continue home medications:   - Take lexapro 1 tablet by mouth once daily   .  Contact a health care provider if:  Your child's condition gets worse.  Your child loses weight or has no appetite.  Your child is nauseous and vomits.  Your child has a fever.  Your child has blood in his or her vomit or stool.  .  Get help right away if:  Your child vomits red blood or a substance that looks like coffee grounds.  Your child is light-headed or faints.  Your child has bright red or black and tarry stools.  Your child vomits repeatedly.  Your child has severe pain in his or her abdomen, or the abdomen is tender to the touch.  Your child has chest pain or shortness of breath.  Your child who is younger than 3 months has a temperature of 100.4°F (38°C) or higher.  Your child who is 3 months to 3 years old has a temperature of 102.2°F (39°C) or higher.  These symptoms may represent a serious problem that is an emergency. Do not wait to see if the symptoms will go away. Get medical help right away. Call your local emergency services (911 in the [place]      SECONDARY DISCHARGE DIAGNOSES  Diagnosis: Abdominal pain  Assessment and Plan of Treatment:

## 2023-09-08 NOTE — ED PEDIATRIC NURSE NOTE - NSNEUBEH_NEU_P_CORE
Please notify pt her pap smear is normal.    Dr Patino  Please notify pt there were signs of a yeast infection on her Pap Test.  If she is having symptoms please give her an Rx for Diflucan 200 mgx1.     no

## 2023-09-08 NOTE — ED PROVIDER NOTE - CLINICAL SUMMARY MEDICAL DECISION MAKING FREE TEXT BOX
18-year-old female with history of some streaks of blood in her emesis, with epigastric abdominal pain, seen at [facility] a few months ago with a CT that was performed and patient was advised to follow-up with GI, who with whom she has an appointment in 3 days.  Patient was also prescribed Pepcid, which she has not taken regularly.  Patient is pending medications, labs, and reevaluation. Patient still in pain after Tylenol.  Pain located in the epigastric area and periumbilical.  Patient agreeable to morphine.  Plan to perform a CT scan to evaluate for intra-abdominal pathology. LMP ended yesterday. Labs reviewed. CT reviewed.  Patient with severe abdominal pain, requiring multiple doses of morphine.  Patient admitted to pediatric floor for pain management.

## 2023-09-08 NOTE — DISCHARGE NOTE PROVIDER - HOSPITAL COURSE
One Liner: 19 yo with intermittent abdominal pain requiring pain control presents with diffuse abdominal pain admitted for pain control.       ED Course: CBC, BMP, Hepatic function, HCG, lipase, ESR, UA, LR bolus, Pepcid, Morphine x2, Tylenol    Inpatient Course (9/8-:   Pt was admitted to the inpatient floor and ___.     Labs and Radiology:    Discharge Vitals and Physical Exam:      Vitals and clinical status stable on discharge.     Discharge Plan:  - Follow up with pediatrician in 1-3 days  - Medication Instructions  >     One Liner: 17 yo with intermittent abdominal pain requiring pain control presents with diffuse abdominal pain admitted for pain control.       ED Course: CBC, BMP, Hepatic function, HCG, lipase, ESR, UA, LR bolus, Pepcid, Morphine x2, Tylenol    Inpatient Course (9/8-:   Pt was admitted to the inpatient floor.  RESP: Patient was stable on RA.  CVS: Patient was hemodynamically stable.  FENGI: Patient tolerated a regular diet. Adult GI was consulted who requested stool studies which were collected on _____. The results were____. Patient was scoped on___.  PAIN: Patient received 6 mg of Morphine as needed.     Labs and Radiology:  C-Reactive Protein, Serum (09.08.23 @ 22:56)    C-Reactive Protein, Serum: 4.3 mg/L    Sedimentation Rate, Erythrocyte (09.08.23 @ 22:56)    Sedimentation Rate, Erythrocyte: 40 mm/Hr    HCG Quantitative, Serum (09.08.23 @ 08:50)    HCG Quantitative, Serum: <1.0: HCG-Quantitative test interpretations: This test is intended only for the  detection & monitoring of pregnancy. For oncology studies order the tumor  marker test "HCG-TM" (hCG-Tumor Marker).  For pregnancy evaluation the reference values are as follows:  Negative:  <=4 mIU/mL      Discharge Vitals and Physical Exam:      Vitals and clinical status stable on discharge.     Discharge Plan:  - Follow up with pediatrician in 1-3 days  - Medication Instructions  >     One Liner: 19 yo with intermittent abdominal pain requiring pain control presents with diffuse abdominal pain admitted for pain control.       ED Course: CBC, BMP, Hepatic function, HCG, lipase, ESR, UA, LR bolus, Pepcid, Morphine x2, Tylenol    Inpatient Course (9/8-:   Pt was admitted to the inpatient floor.  RESP: Patient was stable on RA.  CVS: Patient was hemodynamically stable.  FENGI: Patient tolerated a regular diet. Adult GI was consulted who requested stool studies which were collected on _____. The results were____. Patient was scoped on___ and results showed___. Patient was also started on Pantoprazole 40mg QD for Gastritis. EGD was done and results showed erythema in stomach compatible for non-erosive gastritis and Grade A esophagitis compatible with nonspecific erosive esophagitis. CT showed Mild scattered regions of colonic wall thickening and terminal ileal wall thickening compatible with colitis/enteritis of infectious or inflammatory origin. Patient was also started on Ciprofloxacin 500mg every 12 hours and Flagyl 500mg every 8 hours. She was also given Zofran as needed.   PAIN: Patient received 6 mg of Morphine as needed.     Labs and Radiology:  C-Reactive Protein, Serum (09.08.23 @ 22:56)    C-Reactive Protein, Serum: 4.3 mg/L    Sedimentation Rate, Erythrocyte (09.08.23 @ 22:56)    Sedimentation Rate, Erythrocyte: 40 mm/Hr    HCG Quantitative, Serum (09.08.23 @ 08:50)    HCG Quantitative, Serum: <1.0: HCG-Quantitative test interpretations: This test is intended only for the  detection & monitoring of pregnancy. For oncology studies order the tumor  marker test "HCG-TM" (hCG-Tumor Marker).  For pregnancy evaluation the reference values are as follows:  Negative:  <=4 mIU/mL      Discharge Vitals and Physical Exam:      Vitals and clinical status stable on discharge.     Discharge Plan:  - Follow up with pediatrician in 1-3 days  - Medication Instructions  >     One Liner: 19 yo with intermittent abdominal pain requiring pain control presents with diffuse abdominal pain admitted for pain control.       ED Course: CBC, BMP, Hepatic function, HCG, lipase, ESR, UA, LR bolus, Pepcid, Morphine x2, Tylenol    Inpatient Course (9/8-:   Pt was admitted to the inpatient floor.  RESP: Patient was stable on RA.  CVS: Patient was hemodynamically stable.  FENGI: Patient tolerated a regular diet. Adult GI was consulted who requested stool studies which were collected, . The results were____. Patient was scoped on___ and results showed___. Patient was also started on Pantoprazole 40mg QD for Gastritis. EGD was done and results showed erythema in stomach compatible for non-erosive gastritis and Grade A esophagitis compatible with nonspecific erosive esophagitis. CT showed Mild scattered regions of colonic wall thickening and terminal ileal wall thickening compatible with colitis/enteritis of infectious or inflammatory origin. Patient was also started on Ciprofloxacin 500mg every 12 hours and Flagyl 500mg every 8 hours. She was also given Zofran as needed.   PAIN: Patient received 6 mg of Morphine as needed.     Labs and Radiology:  C-Reactive Protein, Serum (09.08.23 @ 22:56)    C-Reactive Protein, Serum: 4.3 mg/L    Sedimentation Rate, Erythrocyte (09.08.23 @ 22:56)    Sedimentation Rate, Erythrocyte: 40 mm/Hr    HCG Quantitative, Serum (09.08.23 @ 08:50)    HCG Quantitative, Serum: <1.0: HCG-Quantitative test interpretations: This test is intended only for the  detection & monitoring of pregnancy. For oncology studies order the tumor  marker test "HCG-TM" (hCG-Tumor Marker).  For pregnancy evaluation the reference values are as follows:  Negative:  <=4 mIU/mL      Discharge Vitals and Physical Exam:  BP:  HR:        Vitals and clinical status stable on discharge.     Discharge Plan:  - Follow up with pediatrician in 1-3 days  - Medication Instructions  >     One Liner: 19 yo with intermittent abdominal pain requiring pain control presents with diffuse abdominal pain admitted for pain control.     ED Course: CBC, BMP, Hepatic function, HCG, lipase, ESR, UA, LR bolus, Pepcid, Morphine x2, Tylenol    Inpatient Course (9/8/23- 9/13/23):     RESP: Patient was stable on RA.  CVS: Patient was hemodynamically stable.  FENGI: Patient tolerated a regular diet. Adult GI was consulted who requested stool studies for GI PCR and FOBT which reported as negative. Fecal calprotectin and O+P results are pending at time of discharge. EGD showed erythema in stomach compatible for non-erosive gastritis and Grade A esophagitis compatible with nonspecific erosive esophagitis and negative for H.pylori. Patient started on Pantoprazole 40 mg QD x 2 weeks. CT showed Mild scattered regions of colonic wall thickening and terminal ileal wall thickening compatible with colitis/enteritis of infectious or inflammatory origin. Biopsy sample obtained. Colonoscopy reported as normal mucosa in the terminal ileum and whole colon. Biopsy samples were obtained. Patient was also started on Ciprofloxacin 500mg every 12 hours and Flagyl 500mg every 8 hours which was discontinued on discharge. She was also given Zofran as needed.   PAIN: Patient received IV Morphine and tylenol as needed.     Labs and Radiology:  C-Reactive Protein, Serum (09.08.23 @ 22:56)    C-Reactive Protein, Serum: 4.3 mg/L    Sedimentation Rate, Erythrocyte (09.08.23 @ 22:56)    Sedimentation Rate, Erythrocyte: 40 mm/Hr    < from: CT Abdomen and Pelvis w/ Oral Cont and w/ IV Cont (09.08.23 @ 13:22) >    IMPRESSION: Mild scattered regions of colonic wall thickening and terminal ileal wall   thickening compatible with colitis/enteritis of infectious or inflammatory origin.    EGD biopsy results:       Discharge Vitals:  BP: 98/63  HR: 71  SPO2: 99% on RA   RR: 18   TEMP: 97.9    Physical Exam:  Gen: patient is alert, interactive, no acute distress  HEENT: NC/AT, pupils equal, responsive, reactive to light and accomodation, no conjunctivitis or scleral icterus; no nasal discharge or congestion. OP without exudates/erythema.   Chest: CTA b/l, no crackles/wheezes, good air entry, no tachypnea or retractions  CV: regular rate and rhythm, no murmurs   Abd: soft, tenderness to abdominal palpation, nondistended, no HSM appreciated, +BS  Extrem: No joint effusion or tenderness; FROM of all joints; no deformities or erythema noted. 2+ peripheral pulses, WWP.   Neuro: Grossly intact    Vitals and clinical status stable on discharge.     Discharge Plan:  - Follow up with pediatrician ( Dr. Castillo) in 1-3 days  - Follow up with gastroenterologist ( Dr. Fulton) in 2-3 weeks. ( please call to book your appointment)  - Gastroenterologist to follow up on colonoscopy biopsy results   - Medication Instructions  - May take bentyl 1 tablet every 6 hours as needed for abdominal pain  - Take protonix 1 tablet by once daily for 11 days  > Continue home medications:   - Take lexapro 1 tablet by mouth once daily         One Liner: 19 yo with intermittent abdominal pain requiring pain control presents with diffuse abdominal pain admitted for pain control.     ED Course: CBC, BMP, Hepatic function, HCG, lipase, ESR, UA, LR bolus, Pepcid, Morphine x2, Tylenol    Inpatient Course (9/8/23- 9/13/23):     RESP: Patient was stable on RA.  CVS: Patient was hemodynamically stable.  FENGI: Patient tolerated a regular diet. Adult GI was consulted who requested stool studies for GI PCR and FOBT which reported as negative. Fecal calprotectin and O+P results are pending at time of discharge. EGD showed erythema in stomach compatible for non-erosive gastritis and Grade A esophagitis compatible with nonspecific erosive esophagitis and negative for H.pylori. Patient started on Pantoprazole 40 mg QD x 2 weeks. CT showed Mild scattered regions of colonic wall thickening and terminal ileal wall thickening compatible with colitis/enteritis of infectious or inflammatory origin. Biopsy sample obtained. Colonoscopy reported as normal mucosa in the terminal ileum and whole colon. Biopsy samples were obtained. Patient was also started on Ciprofloxacin 500mg every 12 hours and Flagyl 500mg every 8 hours which was discontinued on discharge. She was also given Zofran as needed.   PAIN: Patient received IV Morphine and tylenol as needed.     Labs and Radiology:  C-Reactive Protein, Serum (09.08.23 @ 22:56)    C-Reactive Protein, Serum: 4.3 mg/L    Sedimentation Rate, Erythrocyte (09.08.23 @ 22:56)    Sedimentation Rate, Erythrocyte: 40 mm/Hr    < from: CT Abdomen and Pelvis w/ Oral Cont and w/ IV Cont (09.08.23 @ 13:22) >    IMPRESSION: Mild scattered regions of colonic wall thickening and terminal ileal wall   thickening compatible with colitis/enteritis of infectious or inflammatory origin.    EGD biopsy results: Gastritis. No H pylori detected.     Discharge Vitals:  BP: 98/63  HR: 71  SPO2: 99% on RA   RR: 18   TEMP: 97.9    Physical Exam:  Gen: patient is alert, interactive, no acute distress  HEENT: NC/AT, pupils equal, responsive, reactive to light and accomodation, no conjunctivitis or scleral icterus; no nasal discharge or congestion. OP without exudates/erythema.   Chest: CTA b/l, no crackles/wheezes, good air entry, no tachypnea or retractions  CV: regular rate and rhythm, no murmurs   Abd: soft, tenderness to abdominal palpation, nondistended, no HSM appreciated, +BS  Extrem: No joint effusion or tenderness; FROM of all joints; no deformities or erythema noted. 2+ peripheral pulses, WWP.   Neuro: Grossly intact    Vitals and clinical status stable on discharge.     Discharge Plan:  - Follow up with pediatrician ( Dr. Castillo) in 1-3 days  - Follow up with gastroenterologist ( Dr. Montana) in 2-3 weeks. ( please call to book your appointment)  - Gastroenterologist to follow up on colonoscopy biopsy results   - Medication Instructions  - May take bentyl 1 tablet every 6 hours as needed for abdominal pain  - Take protonix 1 tablet by once daily for 11 days  > Continue home medications:   - Take lexapro 1 tablet by mouth once daily         One Liner: 19 yo with intermittent abdominal pain requiring pain control presents with diffuse abdominal pain admitted for pain control.     ED Course: CBC, BMP, Hepatic function, HCG, lipase, ESR, UA, LR bolus, Pepcid, Morphine x2, Tylenol    Inpatient Course (9/8/23- 9/13/23):     RESP: Patient was stable on RA.  CVS: Patient was hemodynamically stable.  FENGI: Patient tolerated a regular diet. Adult GI was consulted who requested stool studies for GI PCR and FOBT which reported as negative. Fecal calprotectin and O+P results are pending at time of discharge. EGD showed erythema in stomach compatible for non-erosive gastritis and Grade A esophagitis compatible with nonspecific erosive esophagitis and negative for H.pylori. Patient started on Pantoprazole 40 mg QD x 2 weeks. CT showed Mild scattered regions of colonic wall thickening and terminal ileal wall thickening compatible with colitis/enteritis of infectious or inflammatory origin. Biopsy sample obtained. Colonoscopy reported as normal mucosa in the terminal ileum and whole colon. Biopsy samples were obtained and were pending at the time of discharge. Patient was also started on Ciprofloxacin 500mg every 12 hours and Flagyl 500mg every 8 hours which was discontinued on discharge. She was also given Zofran as needed.   PAIN: Patient received IV Morphine and tylenol as needed.     Labs and Radiology:  C-Reactive Protein, Serum (09.08.23 @ 22:56)    C-Reactive Protein, Serum: 4.3 mg/L    Sedimentation Rate, Erythrocyte (09.08.23 @ 22:56)    Sedimentation Rate, Erythrocyte: 40 mm/Hr    < from: CT Abdomen and Pelvis w/ Oral Cont and w/ IV Cont (09.08.23 @ 13:22) >    IMPRESSION: Mild scattered regions of colonic wall thickening and terminal ileal wall   thickening compatible with colitis/enteritis of infectious or inflammatory origin.    EGD biopsy results: Gastritis. No H pylori detected.     Discharge Vitals:  BP: 98/63  HR: 71  SPO2: 99% on RA   RR: 18   TEMP: 97.9    Physical Exam:  Gen: patient is alert, interactive, no acute distress  HEENT: NC/AT, pupils equal, responsive, reactive to light and accomodation, no conjunctivitis or scleral icterus; no nasal discharge or congestion. OP without exudates/erythema.   Chest: CTA b/l, no crackles/wheezes, good air entry, no tachypnea or retractions  CV: regular rate and rhythm, no murmurs   Abd: soft, + tenderness to abdominal palpation throughout, nondistended, no HSM appreciated, +BS  Extrem: No joint effusion or tenderness; FROM of all joints; no deformities or erythema noted. 2+ peripheral pulses, WWP.   Neuro: Grossly intact    Vitals and clinical status stable on discharge.     Discharge Plan:  - Follow up with pediatrician ( Dr. Castillo) in 1-3 days  - Follow up with gastroenterologist ( Dr. Montana) in 2-3 weeks. ( please call to book your appointment)  - Gastroenterologist to follow up on colonoscopy biopsy results   - Medication Instructions  - May take bentyl 1 tablet every 6 hours as needed for abdominal pain until seen by GI  - Take protonix 1 tablet by once daily for 11 days  > Continue home medications:   - Take lexapro 1 tablet by mouth once daily

## 2023-09-08 NOTE — DISCHARGE NOTE PROVIDER - CARE PROVIDER_API CALL
Keo Castillo  [address] [address]  Phone: [phone]  Fax: [phone]  Follow Up Time: 1-3 days   Keo Castillo  [facility]  [address] [place] [address]  Phone: [phone]  Fax: [phone]  Follow Up Time: 1-3 days    Pablito Montana  Gastroenterology  [address] [place] [address]  Phone: [phone]  Fax: [phone]  Follow Up Time: 2 weeks

## 2023-09-08 NOTE — ED PROVIDER NOTE - PROGRESS NOTE DETAILS
EP: Case endorsed to Dr. Menard to follow-up labs, reassess and dispo. Acknowledged from Dr. Goodrich, 18-year-old female with history of some streaks of blood in her emesis, with epigastric abdominal pain, seen at [facility] a few months ago with a CT that was performed and patient was advised to follow-up with GI, who with whom she has an appointment in 3 days.  Patient was also prescribed Pepcid, which she has not taken regularly.  Patient is pending medications, labs, and reevaluation. Derian: Patient still in pain after Tylenol.  Pain located in the epigastric area and periumbilical.  Patient agreeable to morphine.  Plan to perform a CT scan to evaluate for intra-abdominal pathology. Derian: Patient still in pain after Tylenol.  Pain located in the epigastric area and periumbilical.  Patient agreeable to morphine.  Plan to perform a CT scan to evaluate for intra-abdominal pathology. LMP ended yesterday.

## 2023-09-08 NOTE — ED PROVIDER NOTE - PHYSICAL EXAMINATION
CONSTITUTIONAL: Well nourished, non-toxic, in mild distress  SKIN: Warm dry, normal skin turgor  HEAD: NCAT  EYES: EOMI, no scleral icterus  ENT: Moist mucous membranes  NECK: Supple; non tender. Full ROM. No cervical LAD  CARD: RRR, no murmurs, rubs or gallops  RESP: clear to ausculation b/l.  No rales, rhonchi, or wheezing.  GI: soft, + BS, non-distended, no rebound or guarding, No CVA tenderness, +tenderness to right and left lower quadrants, no external hemorrhoids, skin tags, or fissures, no blood on MOISES  NEURO: normal motor. normal sensory.   PSYCH: Cooperative, appropriate. CONSTITUTIONAL: Well nourished, non-toxic, in mild distress  SKIN: Warm dry, normal skin turgor  HEAD: NCAT  EYES: EOMI, no scleral icterus  ENT: Moist mucous membranes  NECK: Supple; non tender. Full ROM. No cervical LAD  CARD: RRR, no murmurs, rubs or gallops  RESP: clear to ausculation b/l.  No rales, rhonchi, or wheezing.  GI: soft, + BS, non-distended, no rebound or guarding, No CVA tenderness, +tenderness to right and left lower quadrants, +mid epigastric tenderness, no external hemorrhoids, skin tags, or fissures, no blood on MOISES  NEURO: normal motor. normal sensory.   PSYCH: Cooperative, appropriate.

## 2023-09-08 NOTE — H&P PEDIATRIC - NSHPPHYSICALEXAM_GEN_ALL_CORE
Physical Exam:  GENERAL: well-appearing, well nourished, no acute distress  HEENT: NCAT, conjunctiva clear and not injected, sclera non-icteric, PERRLA, nares patent, mucous membranes moist, no mucosal lesions, pharynx nonerythematous, no tonsillar hypertrophy or exudate, neck supple, no cervical lymphadenopathy  HEART: RRR, S1, S2, no rubs, murmurs, or gallops, RP present, cap refill <2 seconds  LUNG: CTAB, no wheezing, no ronchi, no crackles, no retractions, no belly breathing, no tachypnea  ABDOMEN: +BS, soft, tender in all 4 quadrants, + Rovsing sign, gaurding, nondistended, no hepatomegaly, no splenomegaly, no hernia  NEURO/MSK: grossly intact  SKIN: good turgor, no rash, no bruising or prominent lesions

## 2023-09-08 NOTE — ED ADULT NURSE REASSESSMENT NOTE - NS ED NURSE REASSESS COMMENT FT1
Pt. received from previous RN. Pt. lying on stretcher, breathing with ease on RA. A&O x4. 18g noted to the L AC; IV intact, no redness/swelling at site. Pending test results.

## 2023-09-08 NOTE — H&P PEDIATRIC - HISTORY OF PRESENT ILLNESS
HPI:  HPI: 17 yo with intermittent abdominal pain requiring pain control presents with diffuse abdominal pain admitted for pain control. Patient has been having abdominal pain for 2 days but last night it was worse. The abdominal pain is diffuse, 9/10, constant, sharp, worse with sitting in bed, flat on back, fetal position helps. She reports vomiting blood, bright red once and 3 times after was streaks with phlegm. She stated there was blood in her stool too. She denies eating anything with a red dye.   The patient has had similar symptoms in the past. In Feb 2023, she was admitted to [facility] [place] and found to have mesenteric ileitis She was discharged on pain control. She states she was given Morphine and Dilaudid. She was supposed to follow up GI for an endoscopy and colonscopy in May. However, she returned to [facility] [place] and found to have gastritis.    HPI: 17 yo with intermittent abdominal pain requiring pain control presents with diffuse abdominal pain admitted for pain control. Patient has been having abdominal pain for 2 days but last night it was worse. The abdominal pain is diffuse, 9/10, constant, sharp, worse with sitting in bed, flat on back, fetal position helps. She reports vomiting blood, bright red once and 3 times after was streaks with phlegm. She stated there was blood in her stool too. She denies eating anything with a red dye.   The patient has had similar symptoms in the past. In Feb 2023, she was admitted to [facility] and found to have mesenteric ileitis She was discharged on pain control. She states she was given Morphine and Dilaudid. She was supposed to follow up GI for an endoscopy and colonscopy in May. However, she returned to [facility] and found to have gastritis.   She states Advil makes her pain worse and last week she was on her period and did take and Advil. She thinks the pain is from the Advil. She has a planned visit with Dr. Levi Soni on Monday 9/11/23. Denies recent weight loss, dysuria, burning on urination, fevers, rash, or SOB.     PMH: Eczema, anxiety  PSH: Endoscopy at 11yo for eating disorder (Anorexia)  Meds:      HPI: 17 yo with intermittent abdominal pain requiring pain control presents with diffuse abdominal pain admitted for pain control. Patient has been having abdominal pain for 2 days but last night it was worse. The abdominal pain is diffuse, 9/10, constant, sharp, worse with sitting in bed, flat on back, fetal position helps. She reports vomiting blood, bright red once and 3 times after was streaks with phlegm. She stated there was blood in her stool too. She denies eating anything with a red dye.   The patient has had similar symptoms in the past. In Feb 2023, she was admitted to [facility] and found to have mesenteric ileitis She was discharged on pain control. She states she was given Morphine and Dilaudid. She was supposed to follow up GI for an endoscopy and colonscopy in May. However, she returned to [facility] and found to have gastritis.   She states Advil makes her pain worse and last week she was on her period and did take and Advil. She thinks the pain is from the Advil. She has a planned visit with Dr. Levi Soni on Monday 9/11/23. Denies recent weight loss, dysuria, burning on urination, fevers, rash, or SOB.     PMH: Eczema, anxiety  PSH: Endoscopy at 11yo for eating disorder (Anorexia)  Meds: Lexapro 20mg daily  ALL: None  HEADSS:  H: Lives at home with mother, twin brother, 2 cats, patient vapes  E: Nursing school  D: Vapes daily, denies any other use of drugs or alcohol  S: Sexually active, uses protection, miscarriage in June 2022. Has been STD tested in the past and denies any positive results.  S: Denies SI or HI  FH: Brother has cyclic vomiting syndrome until age 15.  Vaccines: UTD, no flu, no COVID,  PMD: Dr. Castillo    ED course: CBC, BMP, Hepatic function, HCG, lipase, ESR, UA, LR bolus, Pepcid, Morphine x2, Tylenol        HPI: 17 yo with intermittent abdominal pain requiring pain control presents with diffuse abdominal pain admitted for pain control. Patient has been having abdominal pain for 2 days but last night it was worse. The abdominal pain is diffuse, 9/10, constant, sharp, worse with sitting in bed, flat on back, fetal position helps. She reports vomiting blood, bright red once and 3 times after was streaks with phlegm. She stated there was blood in her stool too. She denies eating anything with a red dye.   The patient has had similar symptoms in the past. In Feb 2023, she was admitted to [facility] and found to have mesenteric ileitis She was discharged on pain control. She states she was given Morphine and Dilaudid. She was supposed to follow up GI for an endoscopy and colonscopy in May. However, at the time of her follow up, her severe pain returned and she was admitted to [facility] and found to have gastritis.   She states Advil makes her pain worse and last week she was on her period and did take and Advil. She thinks the pain is from the Advil. She has a planned visit with Dr. Levi Soni on Monday 9/11/23. Denies recent weight loss, dysuria, burning on urination, fevers, rash, or SOB.     PMH: Eczema, anxiety  PSH: Endoscopy at 11yo for eating disorder (Anorexia)  Meds: Lexapro 20mg daily  ALL: None  HEADSS:  H: Lives at home with mother, twin brother, 2 cats, patient vapes  E: Nursing school  D: Vapes daily, denies any other use of drugs or alcohol  S: Sexually active, uses protection, miscarriage in June 2022. Has been STD tested in the past and denies any positive results.  S: Denies SI or HI  FH: Brother has cyclic vomiting syndrome until age 15.  Vaccines: UTD, no flu, no COVID,  PMD: Dr. Castillo    ED course: CBC, BMP, Hepatic function, HCG, lipase, ESR, UA, LR bolus, Pepcid, Morphine x2, Tylenol

## 2023-09-08 NOTE — H&P PEDIATRIC - NSHPREVIEWOFSYSTEMS_GEN_ALL_CORE
Review of Systems  Constitutional: (-) fever (-) weakness (-) diaphoresis (-) pain  Eyes: (-) change in vision (-) photophobia (-) eye pain  ENT: (-) sore throat (-) ear pain  (-) nasal discharge (-) congestion  Cardiovascular: (-) chest pain (-) palpitations  Respiratory: (-) SOB (-) cough (-) WOB (-) wheeze (-) tightness  GI: (+) abdominal pain (-) nausea (+) vomiting (-) diarrhea (-) constipation  : (-) dysuria (-) hematuria (-) increased frequency (-) increased urgency  Integumentary: (-) rash (-) redness (-) joint pain (-) MSK pain (-) swelling  Neurological:  (-) focal deficit (-) altered mental status (-) dizziness (-) headache  General: (-) recent travel (-) sick contacts (-) decreased PO (-) urine output

## 2023-09-08 NOTE — H&P PEDIATRIC - NSHPLABSRESULTS_GEN_ALL_CORE
12.8   7.71  )-----------( 357      ( 08 Sep 2023 08:50 )             39.1     09-08    138  |  103  |  5<L>  ----------------------------<  88  4.1   |  24  |  0.7    Ca    9.3      08 Sep 2023 08:50    TPro  6.9  /  Alb  4.4  /  TBili  0.3  /  DBili  <0.2  /  AST  17  /  ALT  11<L>  /  AlkPhos  54  09-08    Urine Microscopic-Add On (NC) (09.08.23 @ 12:40)    Epithelial Cells: 5 /HPF    Cast: 0 /LPF    Bacteria: Occasional /HPF    Red Blood Cell - Urine: 2 /HPF    White Blood Cell - Urine: 6 /HPF    Hyaline Casts: 0 /LPF    Urinalysis (09.08.23 @ 12:40)    pH Urine: 6.5    Glucose Qualitative, Urine: Negative mg/dL    Blood, Urine: Moderate    Color: Yellow    Urine Appearance: Clear    Bilirubin: Negative    Ketone - Urine: Negative mg/dL    Specific Gravity: 1.005    Protein, Urine: Negative mg/dL    Urobilinogen: 0.2 mg/dL    Nitrite: Negative    Leukocyte Esterase Concentration: Trace    Hepatic Function Panel (09.08.23 @ 08:50)    Indirect Reacting Bilirubin: >0.1 mg/dL    Protein Total: 6.9 g/dL    Albumin: 4.4 g/dL    Bilirubin Total: 0.3 mg/dL    Bilirubin Direct: <0.2 mg/dL    Alkaline Phosphatase: 54 U/L    Aspartate Aminotransferase (AST/SGOT): 17 U/L    Alanine Aminotransferase (ALT/SGPT): 11 U/L    HCG Quantitative, Serum (09.08.23 @ 08:50)    HCG Quantitative, Serum: <1.0  For pregnancy evaluation the reference values are as follows:  Negative:  <=4 mIU/mL    Lipase (09.08.23 @ 08:50)    Lipase: 14 U/L    CT Abdomen and Pelvis w/ Oral Cont and w/ IV Cont (09.08.23 @ 13:22) >  IMPRESSION:    Mild scattered regions of colonic wall thickening and terminal ileal wall   thickening compatible with colitis/enteritis of infectious or   inflammatory origin.    < end of copied text >

## 2023-09-08 NOTE — H&P PEDIATRIC - ASSESSMENT
Assessment: 17 yo with intermittent abdominal pain requiring pain control presents with diffuse abdominal pain admitted for pain control. Vitals stable. Physical exam significant for diffuse abdominal pain. UA positive for trace leukocytes which could be due to a dirty sample. CT abd and pelvis read states colitis/enteritis. Clinical picture likely a flare of her colitis/enteritis from the Advil she took a few days ago. Patient will benefit from a colonoscopy. Will admit for pain control and GI consult for recommendations.     PLAN:  RESP:  - RA    CVS:  - HDS    FENGI:  - Regular pediatric diet  - Strict Is/Os  - D5NS at M  - Zofran 8 mg PRN IV  - GI consult    ID:  - Tylenol q6 PRN for pain  - NO NSAIDS   Assessment: 19 yo with intermittent abdominal pain requiring pain control presents with diffuse abdominal pain admitted for pain control. Vitals stable. Physical exam significant for diffuse abdominal pain. UA positive for trace leukocytes which could be due to a dirty sample. CT abd and pelvis read states colitis/enteritis. Clinical picture likely a flare of her colitis/enteritis from the Advil she took a few days ago. Patient will benefit from a colonoscopy. Will admit for pain control and GI consult for recommendations.     PLAN:  RESP:  - RA    CVS:  - HDS    FENGI:  - Regular pediatric diet  - Strict Is/Os  - D5NS at M  - Zofran 8 mg PRN IV  - GI consult    ID:  - Tylenol q6 PRN for pain  - Morphine 6mg IV PRN  - NO NSAIDS

## 2023-09-08 NOTE — DISCHARGE NOTE PROVIDER - NSDCMRMEDTOKEN_GEN_ALL_CORE_FT
dicyclomine 20 mg oral tablet: 1 tab(s) orally every 6 hours as needed for abdominal pain  escitalopram 20 mg oral tablet: 1 tab(s) orally once a day  Protonix 40 mg oral delayed release tablet: 1 tab(s) orally once a day

## 2023-09-08 NOTE — PATIENT PROFILE ADULT - MEDICATIONS/VISITS
Patient was seen on Monday (4/25) by Dr Barragan. Patient states that if his symptoms were not improving that Dr Barragan said he would call a prescription in. Patient would like to discuss with a nurse  
\"If no improvement after total of 10-14d since onset of symptoms, or symptoms significantly worsen, advise patient return for evaluation.  At which point we will consider chest imaging and/or a course of oral antibiotics if clinically indicated\" Per Dr. Barragan's note      Cell Phone:       Telephone Information:   Mobile [phone]     Okay to leave a message containing results? Yes    Writer returned patients call. Patient called requesting abx be sent in for him. Writer informed patient of  policy and Dr. Barragan's note stating if patient symptoms are not improving then to advise patient to come back in and be evaluated. Patient strongly voiced his disagreement with writer regarding MDs plan of care. Patient then stated to writer, \"I feel like my throat is closing.\" Writer advised patient to call 911 and go to the ED which patient declined. Patient stated he would \"call his mom.\" Phone call disconnected from patients end at this point. Writer contacted patient again to assure message was relayed and patient was receiving care he needed. Patient answered and stated that his throat has been \"getting tighter and tighter for weeks,\" and he \"knows what strep feels like\" because he's had when he was a child. Patient expressed his desire for abx despite strep coming back negative. Writer advised patient to seek emergency care room treatment or come back to Urgent Care to be reevaluated. Phone call disconnected again from patients end.   
no

## 2023-09-09 LAB
CRP SERPL-MCNC: 4.3 MG/L — HIGH
ERYTHROCYTE [SEDIMENTATION RATE] IN BLOOD: 40 MM/HR — HIGH (ref 0–20)

## 2023-09-09 PROCEDURE — 99222 1ST HOSP IP/OBS MODERATE 55: CPT

## 2023-09-09 PROCEDURE — 99223 1ST HOSP IP/OBS HIGH 75: CPT

## 2023-09-09 RX ORDER — ESCITALOPRAM OXALATE 10 MG/1
20 TABLET, FILM COATED ORAL DAILY
Refills: 0 | Status: DISCONTINUED | OUTPATIENT
Start: 2023-09-09 | End: 2023-09-13

## 2023-09-09 RX ORDER — LIDOCAINE AND PRILOCAINE CREAM 25; 25 MG/G; MG/G
1 CREAM TOPICAL ONCE
Refills: 0 | Status: DISCONTINUED | OUTPATIENT
Start: 2023-09-09 | End: 2023-09-13

## 2023-09-09 RX ORDER — CIPROFLOXACIN LACTATE 400MG/40ML
500 VIAL (ML) INTRAVENOUS EVERY 12 HOURS
Refills: 0 | Status: DISCONTINUED | OUTPATIENT
Start: 2023-09-09 | End: 2023-09-13

## 2023-09-09 RX ORDER — METRONIDAZOLE 500 MG
500 TABLET ORAL EVERY 8 HOURS
Refills: 0 | Status: DISCONTINUED | OUTPATIENT
Start: 2023-09-09 | End: 2023-09-13

## 2023-09-09 RX ADMIN — Medication 650 MILLIGRAM(S): at 19:08

## 2023-09-09 RX ADMIN — MORPHINE SULFATE 6 MILLIGRAM(S): 50 CAPSULE, EXTENDED RELEASE ORAL at 20:02

## 2023-09-09 RX ADMIN — Medication 650 MILLIGRAM(S): at 05:51

## 2023-09-09 RX ADMIN — Medication 500 MILLIGRAM(S): at 15:42

## 2023-09-09 RX ADMIN — MORPHINE SULFATE 6 MILLIGRAM(S): 50 CAPSULE, EXTENDED RELEASE ORAL at 12:32

## 2023-09-09 RX ADMIN — Medication 500 MILLIGRAM(S): at 22:34

## 2023-09-09 RX ADMIN — Medication 650 MILLIGRAM(S): at 23:57

## 2023-09-09 RX ADMIN — MORPHINE SULFATE 6 MILLIGRAM(S): 50 CAPSULE, EXTENDED RELEASE ORAL at 06:04

## 2023-09-09 RX ADMIN — MORPHINE SULFATE 6 MILLIGRAM(S): 50 CAPSULE, EXTENDED RELEASE ORAL at 05:51

## 2023-09-09 RX ADMIN — Medication 500 MILLIGRAM(S): at 15:41

## 2023-09-09 RX ADMIN — SODIUM CHLORIDE 100 MILLILITER(S): 9 INJECTION, SOLUTION INTRAVENOUS at 17:31

## 2023-09-09 RX ADMIN — MORPHINE SULFATE 6 MILLIGRAM(S): 50 CAPSULE, EXTENDED RELEASE ORAL at 20:52

## 2023-09-09 RX ADMIN — Medication 650 MILLIGRAM(S): at 06:04

## 2023-09-09 RX ADMIN — ESCITALOPRAM OXALATE 20 MILLIGRAM(S): 10 TABLET, FILM COATED ORAL at 12:26

## 2023-09-09 RX ADMIN — Medication 500 MILLIGRAM(S): at 22:33

## 2023-09-09 RX ADMIN — MORPHINE SULFATE 6 MILLIGRAM(S): 50 CAPSULE, EXTENDED RELEASE ORAL at 13:00

## 2023-09-09 RX ADMIN — SODIUM CHLORIDE 100 MILLILITER(S): 9 INJECTION, SOLUTION INTRAVENOUS at 05:59

## 2023-09-09 RX ADMIN — Medication 650 MILLIGRAM(S): at 13:00

## 2023-09-09 RX ADMIN — Medication 650 MILLIGRAM(S): at 12:26

## 2023-09-09 RX ADMIN — Medication 650 MILLIGRAM(S): at 18:09

## 2023-09-09 NOTE — CONSULT NOTE ADULT - SUBJECTIVE AND OBJECTIVE BOX
Gastroenterology Consultation:    Patient is a 18y old  Female who presents with a chief complaint of Abdominal pain (08 Sep 2023 22:42)      Admitted on: 09-08-23  HPI:  HPI: 19 yo with intermittent abdominal pain requiring pain control presents with diffuse abdominal pain admitted for pain control. Patient has been having abdominal pain for 2 days but last night it was worse. The abdominal pain is diffuse, 9/10, constant, sharp, worse with sitting in bed, flat on back, fetal position helps. She reports vomiting blood small amount streaks with phlegm. She stated there was blood in her stool too small amount mixed with brown stool She denies eating anything with a red dye.   The patient has had similar symptoms in the past. In Feb 2023, she was admitted to [facility] and found to have mesenteric ileitis She was discharged on pain control. She states she was given Morphine and Dilaudid. She was supposed to follow up GI for an endoscopy and colonscopy in May. However, at the time of her follow up, her severe pain returned and she was admitted to [facility] and found to have gastritis.   She states Advil makes her pain worse and last week she was on her period and did take and Advil. She thinks the pain is from the Advil. She has a planned visit with Dr. Levi Soni on Monday 9/11/23. Denies recent weight loss, dysuria, burning on urination, fevers, rash, or SOB.       Prior EGD: never had   Prior Colonoscopy: never had       PAST MEDICAL & SURGICAL HISTORY:  History of ovarian cyst      Chronic inflammatory small bowel disease      No significant past surgical history          FAMILY HISTORY:  no family h/o Gi cancers     Social History:  Tobacco: N  Alcohol: N  Drugs: N    Home Medications:    MEDICATIONS  (STANDING):  acetaminophen     Tablet .. 650 milliGRAM(s) Oral every 6 hours  dextrose 5% + sodium chloride 0.9%. 1000 milliLiter(s) (100 mL/Hr) IV Continuous <Continuous>  escitalopram 20 milliGRAM(s) Oral daily    MEDICATIONS  (PRN):  lidocaine/prilocaine Cream 1 Application(s) Topical once PRN for venipuncture  morphine  - Injectable 6 milliGRAM(s) IV Push every 4 hours PRN Moderate Pain (4 - 6)  ondansetron Injectable 4 milliGRAM(s) IV Push every 8 hours PRN Nausea and/or Vomiting      Allergies  No Known Allergies      Review of Systems:   Constitutional:  No Fever, No Chills  ENT/Mouth:  No Hearing Changes,  No Difficulty Swallowing  Eyes:  No Eye Pain, No Vision Changes  Cardiovascular:  No Chest Pain, No Palpitations  Respiratory:  No Cough, No Dyspnea  Gastrointestinal:  As described in HPI  Musculoskeletal:  No Joint Swelling, No Back Pain  Skin:  No Skin Lesions, No Jaundice  Neuro:  No Syncope, No Dizziness  Heme/Lymph:  No Bruising, No Bleeding.          Physical Examination:  T(C): 35.9 (09-09-23 @ 07:57), Max: 36.9 (09-08-23 @ 22:00)  HR: 71 (09-09-23 @ 07:57) (66 - 72)  BP: 99/53 (09-09-23 @ 07:57) (98/69 - 103/70)  RR: 18 (09-09-23 @ 07:57) (18 - 18)  SpO2: 99% (09-09-23 @ 07:57) (98% - 99%)  Height (cm): 154.9 (09-08-23 @ 22:00)  Weight (kg): 73.4 (09-08-23 @ 22:00)    09-08-23 @ 07:01  -  09-09-23 @ 07:00  --------------------------------------------------------  IN: 800 mL / OUT: 0 mL / NET: 800 mL    09-09-23 @ 07:01  -  09-09-23 @ 13:28  --------------------------------------------------------  IN: 740 mL / OUT: 0 mL / NET: 740 mL        Constitutional: No acute distress.  Eyes:. Conjunctivae are clear, Sclera is non-icteric.  Ears Nose and Throat: The external ears are normal appearing,  Oral mucosa is pink and moist.  Respiratory:  No signs of respiratory distress. Lung sounds are clear bilaterally.  Cardiovascular:  S1 S2, Regular rate and rhythm.  GI: Abdomen is soft, symmetric, and tender without distention.           Data:                        12.8   7.71  )-----------( 357      ( 08 Sep 2023 08:50 )             39.1     Hgb Trend:  12.8  09-08-23 @ 08:50        09-08    138  |  103  |  5<L>  ----------------------------<  88  4.1   |  24  |  0.7    Ca    9.3      08 Sep 2023 08:50    TPro  6.9  /  Alb  4.4  /  TBili  0.3  /  DBili  <0.2  /  AST  17  /  ALT  11<L>  /  AlkPhos  54  09-08    Liver panel trend:  TBili 0.3   /   AST 17   /   ALT 11   /   AlkP 54   /   Tptn 6.9   /   Alb 4.4    /   DBili <0.2      09-08              Radiology:

## 2023-09-09 NOTE — PROGRESS NOTE PEDS - ASSESSMENT
I am ordering ultrasounds to check his blood vessels. It is a different test than an EMG  pls notify   Notified [address] [facility]   Pt is a 18 year old female complaining diffuse abdominal pain with vomiting, admitted for pain control with past medical history is significant for eczema and anxiety. Vitals are stable. On physical exam, pt appears well, comfortable, and alert x3. PE was positive for R+LUQ, suprapubic, and epigastric pain on palpation, otherwise, unremarkable. Pt states that curling up in a fetal position helps alleviate the pain. No new labs. CT scan showed terminal ileal wall thickening with right lower quadrant lymph node enlargement, descending colon neck wall thickening  Consulted GI. Currently, undergoing a workup for colitis and inflammatory bowel disease. GI recommended adding Ciprofloxacin and Flagyl. Continue pain medications for pain control and liquid diet today and advance as tolerated. Will check fecal calprotectin and GI PCR. If persistent pain will plan for colonoscopy early next week.     PLAN:  RESP:  - RA    CVS:  - HDS    FENGI:  - Regular pediatric diet  - Strict Is/Os  - D5NS at M  - Zofran 4 mg IV q8h OR/N   - GI consulted    ID:  - Tylenol 650 PO q6 ATC   - PO Ciprofloxacin 500mg q12h (9/9-  - PO Flagyl 500mg q8h (9/9  - Morphine 6mg IV PRN q4h  - NO NSAIDS    PSYCH:   - Lexapro 20mg qdaily (home med) Note Text (......Xxx Chief Complaint.): This diagnosis correlates with the Detail Level: Zone Other (Free Text): Patient has a small area of drainage. We will send in an extra seven days of antibiotics. Pt is a 18 year old female complaining diffuse abdominal pain with vomiting, admitted for pain control with past medical history is significant for eczema and anxiety. Vitals are stable. On physical exam, pt appears well, comfortable, and alert x3. PE was positive for R+LUQ, suprapubic, and epigastric pain on palpation, otherwise, unremarkable. Pt states that curling up in a fetal position helps alleviate the pain. No new labs. CT scan showed terminal ileal wall thickening with right lower quadrant lymph node enlargement, descending colon neck wall thickening. Consulted GI. Currently, undergoing a workup for colitis and inflammatory bowel disease. GI recommended adding Ciprofloxacin and Flagyl. Continue pain medications for pain control and liquid diet today and advance as tolerated. Will check fecal calprotectin and GI PCR. If persistent pain will plan for colonoscopy early next week.     PLAN:  RESP:  - RA    CVS:  - HDS    FENGI:  - Regular pediatric diet  - Strict Is/Os  - D5NS at M  - Zofran 4 mg IV q8h NV/N   - GI consulted    ID:  - Tylenol 650 PO q6 ATC   - PO Ciprofloxacin 500mg q12h (9/9-  - PO Flagyl 500mg q8h (9/9  - Morphine 6mg IV PRN q4h  - NO NSAIDS    PSYCH:   - Lexapro 20mg qdaily (home med) Render Risk Assessment In Note?: yes Pt is a 18 year old female complaining diffuse abdominal pain with vomiting, admitted for pain control with past medical history is significant for eczema and anxiety. Vitals are stable. On physical exam, pt appears well, uncomfortable, and alert x3. PE was positive for R+LUQ, suprapubic, and epigastric pain on palpation, otherwise, unremarkable. Pt states that curling up in a fetal position helps alleviate the pain. No new labs. CT scan showed terminal ileal wall thickening with right lower quadrant lymph node enlargement, descending colon neck wall thickening. Consulted GI. Currently, undergoing a workup for colitis and inflammatory bowel disease. GI recommended adding Ciprofloxacin and Flagyl. Continue pain medications for pain control and liquid diet today and advance as tolerated. Will check fecal calprotectin and GI PCR. If persistent pain will plan for colonoscopy early next week.     PLAN:  RESP:  - RA    CVS:  - HDS    FENGI:  - Regular pediatric diet  - Strict Is/Os  - D5NS at M  - Zofran 4 mg IV q8h PA/N   - GI consulted    ID:  - Tylenol 650 PO q6 ATC   - PO Ciprofloxacin 500mg q12h (9/9-  - PO Flagyl 500mg q8h (9/9  - Morphine 6mg IV PRN q4h  - NO NSAIDS    PSYCH:   - Lexapro 20mg qdaily (home med)

## 2023-09-09 NOTE — CONSULT NOTE ADULT - ASSESSMENT
17 yo with intermittent abdominal pain requiring pain. Gi was consulted for abdominal pain     #)Abdominal pain with vomiting  Differential diagnosis includes rule out infectious versus inflammatory (r/o IBD) vs less likely ischemic vs NSAID induced terminal ileitis  History of similar episode happened in February admitted at [facility] at that time found to have terminal ileitis  -History of NSAID intake  -Denies any family history of IBD  -Denies any history of travel or diarrhea  -Hemodynamically stable  -Normal hemoglobin and LFTs  -Increased ESR, mild increase in CRP  -CT scan showed terminal ileal wall thickening with right lower  lower quadrant lymph node enlargement, descending colon neck wall thickening  -Never had EGD colonoscopy in the past      Recs:   Liquid diet today and advance as tolerated  Continue with IV fluids  Pain control  Check fecal calprotectin GI PCR  Recommend to start on Cipro and Flagyl  If persistent pain will plan for colonoscopy early next week    #)Small amount of bright red bleeding per rectum with small amount of bloody emesis  Hemoglobin stable no further episodes of vomiting or bright red bleeding per rectum  Low suspicions for any active GI bleed  Monitor hemoglobin

## 2023-09-09 NOTE — PROGRESS NOTE PEDS - SUBJECTIVE AND OBJECTIVE BOX
Patient is a 18y old  Female who presents with a chief complaint of abdominal pain (09 Sep 2023 13:28)      INTERVAL/OVERNIGHT EVENTS:    Overnight: No overnight events.  Pt was seen at bedside this morning. Pt complains of diffuse abdominal pain.    PAST MEDICAL & SURGICAL HISTORY:  History of ovarian cyst      Chronic inflammatory small bowel disease      No significant past surgical history          FAMILY HISTORY:      MEDICATIONS, ALLERGIES, & DIET:  MEDICATIONS  (STANDING):  acetaminophen     Tablet .. 650 milliGRAM(s) Oral every 6 hours  ciprofloxacin     Tablet 500 milliGRAM(s) Oral every 12 hours  dextrose 5% + sodium chloride 0.9%. 1000 milliLiter(s) (100 mL/Hr) IV Continuous <Continuous>  escitalopram 20 milliGRAM(s) Oral daily  metroNIDAZOLE    Tablet 500 milliGRAM(s) Oral every 8 hours    MEDICATIONS  (PRN):  lidocaine/prilocaine Cream 1 Application(s) Topical once PRN for venipuncture  morphine  - Injectable 6 milliGRAM(s) IV Push every 4 hours PRN Moderate Pain (4 - 6)  ondansetron Injectable 4 milliGRAM(s) IV Push every 8 hours PRN Nausea and/or Vomiting    Allergies    No Known Allergies    Intolerances        VITALS, INTAKE/OUTPUT:  Vital Signs Last 24 Hrs  T(C): 35.9 (09 Sep 2023 07:57), Max: 36.9 (08 Sep 2023 22:00)  T(F): 96.7 (09 Sep 2023 07:57), Max: 98.4 (08 Sep 2023 22:00)  HR: 71 (09 Sep 2023 07:57) (66 - 72)  BP: 99/53 (09 Sep 2023 07:57) (98/69 - 103/70)  BP(mean): 6 (09 Sep 2023 07:57) (6 - 79)  RR: 18 (09 Sep 2023 07:57) (18 - 18)  SpO2: 99% (09 Sep 2023 07:57) (98% - 99%)    Parameters below as of 09 Sep 2023 07:57  Patient On (Oxygen Delivery Method): room air        Daily Height in cm: 154.94 (08 Sep 2023 22:00)    Daily   BMI (kg/m2): 30.6 ( @ 22:00)    I&O's Summary    08 Sep 2023 07:01  -  09 Sep 2023 07:00  --------------------------------------------------------  IN: 800 mL / OUT: 0 mL / NET: 800 mL    09 Sep 2023 07:01  -  09 Sep 2023 14:32  --------------------------------------------------------  IN: 740 mL / OUT: 0 mL / NET: 740 mL        PHYSICAL EXAM:  I examined the patient at approximately 10:00 am during Family Centered rounds with mother/father present at bedside  VS reviewed, stable.  Gen: patient is interactive, well appearing, no acute distress  Chest: CTA b/l, no crackles/wheezes, good air entry, no tachypnea or retractions  CV: regular rate and rhythm, no murmurs   Abd: soft, tenderness in the R+LUQ, epigastric, and suprapubic region, nondistended, no HSM appreciated, +BS    INTERVAL LAB RESULTS:                        12.8   7.71  )-----------( 357      ( 08 Sep 2023 08:50 )             39.1         Urinalysis Basic - ( 08 Sep 2023 12:40 )    Color: Yellow / Appearance: Clear / S.005 / pH: x  Gluc: x / Ketone: Negative mg/dL  / Bili: Negative / Urobili: 0.2 mg/dL   Blood: x / Protein: Negative mg/dL / Nitrite: Negative   Leuk Esterase: Trace / RBC: 2 /HPF / WBC 6 /HPF   Sq Epi: x / Non Sq Epi: 5 /HPF / Bacteria: Occasional /HPF      UCx       INTERVAL IMAGING STUDIES:         Patient is a 18y old  Female who presents with a chief complaint of abdominal pain (09 Sep 2023 13:28)    INTERVAL/OVERNIGHT EVENTS:    Overnight: No overnight events.  Pt was seen at bedside this morning. Pt complains of diffuse abdominal pain.    PAST MEDICAL & SURGICAL HISTORY:  History of ovarian cyst      Chronic inflammatory small bowel disease      No significant past surgical history          FAMILY HISTORY:      MEDICATIONS, ALLERGIES, & DIET:  MEDICATIONS  (STANDING):  acetaminophen     Tablet .. 650 milliGRAM(s) Oral every 6 hours  ciprofloxacin     Tablet 500 milliGRAM(s) Oral every 12 hours  dextrose 5% + sodium chloride 0.9%. 1000 milliLiter(s) (100 mL/Hr) IV Continuous <Continuous>  escitalopram 20 milliGRAM(s) Oral daily  metroNIDAZOLE    Tablet 500 milliGRAM(s) Oral every 8 hours    MEDICATIONS  (PRN):  lidocaine/prilocaine Cream 1 Application(s) Topical once PRN for venipuncture  morphine  - Injectable 6 milliGRAM(s) IV Push every 4 hours PRN Moderate Pain (4 - 6)  ondansetron Injectable 4 milliGRAM(s) IV Push every 8 hours PRN Nausea and/or Vomiting    Allergies    No Known Allergies    Intolerances        VITALS, INTAKE/OUTPUT:  Vital Signs Last 24 Hrs  T(C): 35.9 (09 Sep 2023 07:57), Max: 36.9 (08 Sep 2023 22:00)  T(F): 96.7 (09 Sep 2023 07:57), Max: 98.4 (08 Sep 2023 22:00)  HR: 71 (09 Sep 2023 07:57) (66 - 72)  BP: 99/53 (09 Sep 2023 07:57) (98/69 - 103/70)  BP(mean): 6 (09 Sep 2023 07:57) (6 - 79)  RR: 18 (09 Sep 2023 07:57) (18 - 18)  SpO2: 99% (09 Sep 2023 07:57) (98% - 99%)    Parameters below as of 09 Sep 2023 07:57  Patient On (Oxygen Delivery Method): room air        Daily Height in cm: 154.94 (08 Sep 2023 22:00)    Daily   BMI (kg/m2): 30.6 ( @ 22:00)    I&O's Summary    08 Sep 2023 07:01  -  09 Sep 2023 07:00  --------------------------------------------------------  IN: 800 mL / OUT: 0 mL / NET: 800 mL    09 Sep 2023 07:01  -  09 Sep 2023 14:32  --------------------------------------------------------  IN: 740 mL / OUT: 0 mL / NET: 740 mL        PHYSICAL EXAM:  I examined the patient at approximately 10:00 am during Family Centered rounds with mother/father present at bedside  VS reviewed, stable.  Gen: patient is interactive, well appearing, no acute distress  Chest: CTA b/l, no crackles/wheezes, good air entry, no tachypnea or retractions  CV: regular rate and rhythm, no murmurs   Abd: soft, tenderness in the R+LUQ, epigastric, and suprapubic region, nondistended, no HSM appreciated, +BS    INTERVAL LAB RESULTS:                        12.8   7.71  )-----------( 357      ( 08 Sep 2023 08:50 )             39.1         Urinalysis Basic - ( 08 Sep 2023 12:40 )    Color: Yellow / Appearance: Clear / S.005 / pH: x  Gluc: x / Ketone: Negative mg/dL  / Bili: Negative / Urobili: 0.2 mg/dL   Blood: x / Protein: Negative mg/dL / Nitrite: Negative   Leuk Esterase: Trace / RBC: 2 /HPF / WBC 6 /HPF   Sq Epi: x / Non Sq Epi: 5 /HPF / Bacteria: Occasional /HPF      UCx       INTERVAL IMAGING STUDIES:         Patient is a 18y old  Female who presents with a chief complaint of abdominal pain (09 Sep 2023 13:28)    INTERVAL/OVERNIGHT EVENTS:    Overnight: No overnight events.  Pt was seen at bedside this morning. Pt complains of diffuse abdominal pain.    PAST MEDICAL & SURGICAL HISTORY:  History of ovarian cyst      Chronic inflammatory small bowel disease      No significant past surgical history          FAMILY HISTORY:      MEDICATIONS, ALLERGIES, & DIET:  MEDICATIONS  (STANDING):  acetaminophen     Tablet .. 650 milliGRAM(s) Oral every 6 hours  ciprofloxacin     Tablet 500 milliGRAM(s) Oral every 12 hours  dextrose 5% + sodium chloride 0.9%. 1000 milliLiter(s) (100 mL/Hr) IV Continuous <Continuous>  escitalopram 20 milliGRAM(s) Oral daily  metroNIDAZOLE    Tablet 500 milliGRAM(s) Oral every 8 hours    MEDICATIONS  (PRN):  lidocaine/prilocaine Cream 1 Application(s) Topical once PRN for venipuncture  morphine  - Injectable 6 milliGRAM(s) IV Push every 4 hours PRN Moderate Pain (4 - 6)  ondansetron Injectable 4 milliGRAM(s) IV Push every 8 hours PRN Nausea and/or Vomiting    Allergies    No Known Allergies    Intolerances        VITALS, INTAKE/OUTPUT:  Vital Signs Last 24 Hrs  T(C): 35.9 (09 Sep 2023 07:57), Max: 36.9 (08 Sep 2023 22:00)  T(F): 96.7 (09 Sep 2023 07:57), Max: 98.4 (08 Sep 2023 22:00)  HR: 71 (09 Sep 2023 07:57) (66 - 72)  BP: 99/53 (09 Sep 2023 07:57) (98/69 - 103/70)  BP(mean): 6 (09 Sep 2023 07:57) (6 - 79)  RR: 18 (09 Sep 2023 07:57) (18 - 18)  SpO2: 99% (09 Sep 2023 07:57) (98% - 99%)    Parameters below as of 09 Sep 2023 07:57  Patient On (Oxygen Delivery Method): room air        Daily Height in cm: 154.94 (08 Sep 2023 22:00)    Daily   BMI (kg/m2): 30.6 ( @ 22:00)    I&O's Summary    08 Sep 2023 07:01  -  09 Sep 2023 07:00  --------------------------------------------------------  IN: 800 mL / OUT: 0 mL / NET: 800 mL    09 Sep 2023 07:01  -  09 Sep 2023 14:32  --------------------------------------------------------  IN: 740 mL / OUT: 0 mL / NET: 740 mL        PHYSICAL EXAM:  I examined the patient at approximately 10:00 am.  VS reviewed, stable.  Gen: patient is interactive, well appearing, no acute distress  Chest: CTA b/l, no crackles/wheezes, good air entry, no tachypnea or retractions  CV: regular rate and rhythm, no murmurs   Abd: soft, tenderness in the R+LUQ, epigastric, and suprapubic region, nondistended, no HSM appreciated, +BS    INTERVAL LAB RESULTS:                        12.8   7.71  )-----------( 357      ( 08 Sep 2023 08:50 )             39.1         Urinalysis Basic - ( 08 Sep 2023 12:40 )    Color: Yellow / Appearance: Clear / S.005 / pH: x  Gluc: x / Ketone: Negative mg/dL  / Bili: Negative / Urobili: 0.2 mg/dL   Blood: x / Protein: Negative mg/dL / Nitrite: Negative   Leuk Esterase: Trace / RBC: 2 /HPF / WBC 6 /HPF   Sq Epi: x / Non Sq Epi: 5 /HPF / Bacteria: Occasional /HPF      UCx       INTERVAL IMAGING STUDIES:

## 2023-09-10 PROCEDURE — 99232 SBSQ HOSP IP/OBS MODERATE 35: CPT

## 2023-09-10 RX ORDER — PANTOPRAZOLE SODIUM 20 MG/1
40 TABLET, DELAYED RELEASE ORAL
Refills: 0 | Status: DISCONTINUED | OUTPATIENT
Start: 2023-09-10 | End: 2023-09-13

## 2023-09-10 RX ORDER — LANOLIN ALCOHOL/MO/W.PET/CERES
5 CREAM (GRAM) TOPICAL AT BEDTIME
Refills: 0 | Status: DISCONTINUED | OUTPATIENT
Start: 2023-09-10 | End: 2023-09-13

## 2023-09-10 RX ADMIN — Medication 500 MILLIGRAM(S): at 10:50

## 2023-09-10 RX ADMIN — PANTOPRAZOLE SODIUM 40 MILLIGRAM(S): 20 TABLET, DELAYED RELEASE ORAL at 06:58

## 2023-09-10 RX ADMIN — Medication 5 MILLIGRAM(S): at 22:23

## 2023-09-10 RX ADMIN — Medication 500 MILLIGRAM(S): at 22:23

## 2023-09-10 RX ADMIN — MORPHINE SULFATE 6 MILLIGRAM(S): 50 CAPSULE, EXTENDED RELEASE ORAL at 20:57

## 2023-09-10 RX ADMIN — ONDANSETRON 4 MILLIGRAM(S): 8 TABLET, FILM COATED ORAL at 12:31

## 2023-09-10 RX ADMIN — Medication 650 MILLIGRAM(S): at 06:58

## 2023-09-10 RX ADMIN — ESCITALOPRAM OXALATE 20 MILLIGRAM(S): 10 TABLET, FILM COATED ORAL at 10:50

## 2023-09-10 RX ADMIN — MORPHINE SULFATE 6 MILLIGRAM(S): 50 CAPSULE, EXTENDED RELEASE ORAL at 03:53

## 2023-09-10 RX ADMIN — Medication 500 MILLIGRAM(S): at 06:58

## 2023-09-10 RX ADMIN — SODIUM CHLORIDE 100 MILLILITER(S): 9 INJECTION, SOLUTION INTRAVENOUS at 03:03

## 2023-09-10 RX ADMIN — Medication 650 MILLIGRAM(S): at 12:10

## 2023-09-10 RX ADMIN — Medication 500 MILLIGRAM(S): at 14:55

## 2023-09-10 RX ADMIN — MORPHINE SULFATE 6 MILLIGRAM(S): 50 CAPSULE, EXTENDED RELEASE ORAL at 06:25

## 2023-09-10 RX ADMIN — MORPHINE SULFATE 6 MILLIGRAM(S): 50 CAPSULE, EXTENDED RELEASE ORAL at 20:14

## 2023-09-10 RX ADMIN — MORPHINE SULFATE 6 MILLIGRAM(S): 50 CAPSULE, EXTENDED RELEASE ORAL at 00:51

## 2023-09-10 RX ADMIN — Medication 650 MILLIGRAM(S): at 00:06

## 2023-09-10 RX ADMIN — MORPHINE SULFATE 6 MILLIGRAM(S): 50 CAPSULE, EXTENDED RELEASE ORAL at 01:39

## 2023-09-10 NOTE — PROGRESS NOTE PEDS - ASSESSMENT
Pt is a 18 year old female complaining diffuse abdominal pain with vomiting, admitted for pain control with past medical history is significant for eczema and anxiety. Vitals are stable. On physical exam, pt appears well, uncomfortable, and alert x3. PE was positive for R+LUQ, suprapubic, and epigastric pain on palpation, otherwise, unremarkable. Pt states that curling up in a fetal position helps alleviate the pain. No new labs. CT scan showed terminal ileal wall thickening with right lower quadrant lymph node enlargement, descending colon neck wall thickening. Consulted GI. Currently, undergoing a workup for colitis and inflammatory bowel disease. GI recommended adding Ciprofloxacin and Flagyl. Continue pain medications for pain control and liquid diet today and advance as tolerated. Will check fecal calprotectin and GI PCR. If persistent pain will plan for colonoscopy early next week.     PLAN:  RESP:  - RA    CVS:  - HDS    FENGI:  - Regular pediatric diet  - Strict Is/Os  - D5NS at M  - Zofran 4 mg IV q8h WV/N   - GI consulted    ID:  - Tylenol 650 PO q6 ATC   - PO Ciprofloxacin 500mg q12h (9/9-  - PO Flagyl 500mg q8h (9/9  - Morphine 6mg IV PRN q4h  - NO NSAIDS    PSYCH:   - Lexapro 20mg qdaily (home med) Pt is a 18 year old female complaining diffuse abdominal pain with vomiting, admitted for pain control diagnosed with ileitis/colitis on CT. Vitals are stable. On physical exam, pt appears well, uncomfortable, and alert x3. PE was positive for R+LUQ, suprapubic, and epigastric pain on palpation, otherwise, unremarkable. Currently, undergoing a workup for colitis and inflammatory bowel disease. Continue pain medications for pain control and prepare for Colonoscopy on Tuesday (09/12)    PLAN:  RESP:  - RA    CVS:  - HDS    FENGI:  - Regular pediatric diet  - Strict Is/Os  - D5NS at M  - Zofran 4 mg IV q8h OR/N   - GI consulted    ID:  - Tylenol 650 PO q6 ATC   - PO Ciprofloxacin 500mg q12h (9/9-  - PO Flagyl 500mg q8h (9/9  - Morphine 6mg IV PRN q4h  - NO NSAIDS    PSYCH:   - Lexapro 20mg qdaily (home med)

## 2023-09-10 NOTE — PROGRESS NOTE PEDS - SUBJECTIVE AND OBJECTIVE BOX
Patient is a 18y old  Female who presents with a chief complaint of abdominal pain (09 Sep 2023 13:28)    INTERVAL/OVERNIGHT EVENTS:    Overnight: No overnight events.  Pt was seen at bedside this morning. Pt complains of diffuse abdominal pain.    PAST MEDICAL & SURGICAL HISTORY:  History of ovarian cyst      Chronic inflammatory small bowel disease      No significant past surgical history          FAMILY HISTORY:      MEDICATIONS, ALLERGIES, & DIET:  MEDICATIONS  (STANDING):  acetaminophen     Tablet .. 650 milliGRAM(s) Oral every 6 hours  ciprofloxacin     Tablet 500 milliGRAM(s) Oral every 12 hours  dextrose 5% + sodium chloride 0.9%. 1000 milliLiter(s) (100 mL/Hr) IV Continuous <Continuous>  escitalopram 20 milliGRAM(s) Oral daily  metroNIDAZOLE    Tablet 500 milliGRAM(s) Oral every 8 hours    MEDICATIONS  (PRN):  lidocaine/prilocaine Cream 1 Application(s) Topical once PRN for venipuncture  morphine  - Injectable 6 milliGRAM(s) IV Push every 4 hours PRN Moderate Pain (4 - 6)  ondansetron Injectable 4 milliGRAM(s) IV Push every 8 hours PRN Nausea and/or Vomiting    Allergies    No Known Allergies    Intolerances        VITALS, INTAKE/OUTPUT:  Vital Signs Last 24 Hrs  T(C): 35.9 (09 Sep 2023 07:57), Max: 36.9 (08 Sep 2023 22:00)  T(F): 96.7 (09 Sep 2023 07:57), Max: 98.4 (08 Sep 2023 22:00)  HR: 71 (09 Sep 2023 07:57) (66 - 72)  BP: 99/53 (09 Sep 2023 07:57) (98/69 - 103/70)  BP(mean): 6 (09 Sep 2023 07:57) (6 - 79)  RR: 18 (09 Sep 2023 07:57) (18 - 18)  SpO2: 99% (09 Sep 2023 07:57) (98% - 99%)    Parameters below as of 09 Sep 2023 07:57  Patient On (Oxygen Delivery Method): room air        Daily Height in cm: 154.94 (08 Sep 2023 22:00)    Daily   BMI (kg/m2): 30.6 ( @ 22:00)    I&O's Summary    08 Sep 2023 07:01  -  09 Sep 2023 07:00  --------------------------------------------------------  IN: 800 mL / OUT: 0 mL / NET: 800 mL    09 Sep 2023 07:01  -  09 Sep 2023 14:32  --------------------------------------------------------  IN: 740 mL / OUT: 0 mL / NET: 740 mL        PHYSICAL EXAM:  I examined the patient at approximately 10:00 am.  VS reviewed, stable.  Gen: patient is interactive, well appearing, no acute distress  Chest: CTA b/l, no crackles/wheezes, good air entry, no tachypnea or retractions  CV: regular rate and rhythm, no murmurs   Abd: soft, tenderness in the R+LUQ, epigastric, and suprapubic region, nondistended, no HSM appreciated, +BS    INTERVAL LAB RESULTS:                        12.8   7.71  )-----------( 357      ( 08 Sep 2023 08:50 )             39.1         Urinalysis Basic - ( 08 Sep 2023 12:40 )    Color: Yellow / Appearance: Clear / S.005 / pH: x  Gluc: x / Ketone: Negative mg/dL  / Bili: Negative / Urobili: 0.2 mg/dL   Blood: x / Protein: Negative mg/dL / Nitrite: Negative   Leuk Esterase: Trace / RBC: 2 /HPF / WBC 6 /HPF   Sq Epi: x / Non Sq Epi: 5 /HPF / Bacteria: Occasional /HPF      UCx       INTERVAL IMAGING STUDIES:         Patient is a 18y old  Female who presents with a chief complaint of abdominal pain (09 Sep 2023 13:28)    INTERVAL/OVERNIGHT EVENTS:    Overnight: No overnight events.  Pt was seen at bedside this morning. Pt complains of diffuse abdominal pain.    PAST MEDICAL & SURGICAL HISTORY:  History of ovarian cyst      Chronic inflammatory small bowel disease      No significant past surgical history          FAMILY HISTORY:      MEDICATIONS, ALLERGIES, & DIET:  MEDICATIONS  (STANDING):  acetaminophen     Tablet .. 650 milliGRAM(s) Oral every 6 hours  ciprofloxacin     Tablet 500 milliGRAM(s) Oral every 12 hours  dextrose 5% + sodium chloride 0.9%. 1000 milliLiter(s) (100 mL/Hr) IV Continuous <Continuous>  escitalopram 20 milliGRAM(s) Oral daily  metroNIDAZOLE    Tablet 500 milliGRAM(s) Oral every 8 hours    MEDICATIONS  (PRN):  lidocaine/prilocaine Cream 1 Application(s) Topical once PRN for venipuncture  morphine  - Injectable 6 milliGRAM(s) IV Push every 4 hours PRN Moderate Pain (4 - 6)  ondansetron Injectable 4 milliGRAM(s) IV Push every 8 hours PRN Nausea and/or Vomiting    Allergies    No Known Allergies    Intolerances        VITALS, INTAKE/OUTPUT:  Vital Signs Last 24 Hrs  T(C): 36.7 (10 Sep 2023 15:36), Max: 36.8 (10 Sep 2023 04:02)  T(F): 98.1 (10 Sep 2023 15:36), Max: 98.2 (10 Sep 2023 04:02)  HR: 71 (10 Sep 2023 15:36) (62 - 81)  BP: 92/58 (10 Sep 2023 15:36) (92/58 - 121/78)  BP(mean): 70 (10 Sep 2023 15:36) (70 - 93)  RR: 18 (10 Sep 2023 15:36) (18 - 20)  SpO2: 98% (10 Sep 2023 15:36) (98% - 100%)    Parameters below as of 10 Sep 2023 15:36  Patient On (Oxygen Delivery Method): room air    Daily Height in cm: 154.94 (08 Sep 2023 22:00)    Daily   BMI (kg/m2): 30.6 (-08 @ 22:00)    I&O's Summary    09 Sep 2023 07:01  -  10 Sep 2023 07:00  --------------------------------------------------------  IN: 3640 mL / OUT: 0 mL / NET: 3640 mL    10 Sep 2023 07:01  -  10 Sep 2023 19:59  --------------------------------------------------------  IN: 1000 mL / OUT: 0 mL / NET: 1000 mL    PHYSICAL EXAM:  I examined the patient at approximately 10:00 am.  VS reviewed, stable.  Gen: patient is interactive, well appearing, no acute distress  Chest: CTA b/l, no crackles/wheezes, good air entry, no tachypnea or retractions  CV: regular rate and rhythm, no murmurs   Abd: soft, tenderness in the R+LUQ, epigastric, and suprapubic region, nondistended, no HSM appreciated, +BS    INTERVAL LAB RESULTS:                        12.8   7.71  )-----------( 357      ( 08 Sep 2023 08:50 )             39.1         Urinalysis Basic - ( 08 Sep 2023 12:40 )    Color: Yellow / Appearance: Clear / S.005 / pH: x  Gluc: x / Ketone: Negative mg/dL  / Bili: Negative / Urobili: 0.2 mg/dL   Blood: x / Protein: Negative mg/dL / Nitrite: Negative   Leuk Esterase: Trace / RBC: 2 /HPF / WBC 6 /HPF   Sq Epi: x / Non Sq Epi: 5 /HPF / Bacteria: Occasional /HPF      UCx       INTERVAL IMAGING STUDIES:

## 2023-09-10 NOTE — PROGRESS NOTE ADULT - SUBJECTIVE AND OBJECTIVE BOX
Gastroenterology progress note:     Patient is a 18y old  Female who presents with a chief complaint of Abdominal pain (10 Sep 2023 09:24)       Admitted on: 09-08-23    We are following the patient for abdominal pain      Interval History:  still c/o abdominal pain 5/10   no BM since last 1 day   able to tolerate diet        PAST MEDICAL & SURGICAL HISTORY:  History of ovarian cyst      Chronic inflammatory small bowel disease      No significant past surgical history          MEDICATIONS  (STANDING):  acetaminophen     Tablet .. 650 milliGRAM(s) Oral every 6 hours  ciprofloxacin     Tablet 500 milliGRAM(s) Oral every 12 hours  dextrose 5% + sodium chloride 0.9%. 1000 milliLiter(s) (100 mL/Hr) IV Continuous <Continuous>  escitalopram 20 milliGRAM(s) Oral daily  metroNIDAZOLE    Tablet 500 milliGRAM(s) Oral every 8 hours  pantoprazole    Tablet 40 milliGRAM(s) Oral before breakfast    MEDICATIONS  (PRN):  lidocaine/prilocaine Cream 1 Application(s) Topical once PRN for venipuncture  morphine  - Injectable 6 milliGRAM(s) IV Push every 4 hours PRN Moderate Pain (4 - 6)  ondansetron Injectable 4 milliGRAM(s) IV Push every 8 hours PRN Nausea and/or Vomiting      Allergies  No Known Allergies      Review of Systems:   Cardiovascular:  No Chest Pain, No Palpitations  Respiratory:  No Cough, No Dyspnea  Gastrointestinal:  As described in HPI    Physical Examination:  T(C): 36.5 (09-10-23 @ 11:05), Max: 36.8 (09-10-23 @ 04:02)  HR: 66 (09-10-23 @ 11:05) (62 - 81)  BP: 95/58 (09-10-23 @ 11:05) (95/58 - 121/78)  RR: 18 (09-10-23 @ 11:05) (18 - 20)  SpO2: 100% (09-10-23 @ 11:05) (98% - 100%)      09-09-23 @ 07:01  -  09-10-23 @ 07:00  --------------------------------------------------------  IN: 3640 mL / OUT: 0 mL / NET: 3640 mL      Constitutional: No acute distress.  Respiratory:  No signs of respiratory distress. Lung sounds are clear bilaterally.  Cardiovascular:  S1 S2, Regular rate and rhythm.  Abdominal: Abdomen is soft, symmetric, and non-tender without distention.         Data:    Hgb trend:  12.8  09-08-23 @ 08:50              Liver panel trend:  TBili 0.3   /   AST 17   /   ALT 11   /   AlkP 54   /   Tptn 6.9   /   Alb 4.4    /   DBili <0.2      09-08             Radiology:

## 2023-09-10 NOTE — PROGRESS NOTE ADULT - ASSESSMENT
19 yo with intermittent abdominal pain requiring pain. Gi was consulted for abdominal pain     #)Abdominal pain with vomiting  Differential diagnosis includes rule out infectious versus inflammatory (r/o IBD) vs less likely ischemic vs NSAID induced terminal ileitis  History of similar episode happened in February admitted at [facility] at that time found to have terminal ileitis  -History of NSAID intake  -Denies any family history of IBD  -Denies any history of travel or diarrhea  -Hemodynamically stable  -Normal hemoglobin and LFTs  -Increased ESR, mild increase in CRP  -CT scan showed terminal ileal wall thickening with right lower  lower quadrant lymph node enlargement, descending colon neck wall thickening  -Never had EGD colonoscopy in the past    Today:   still c/o abdominal pain 5/10   no BM since last 1 day   able to tolerate diet       Recs:   diet as tolerated   Continue with IV fluids as needed  Pain control  Check fecal calprotectin GI PCR if having a BM   c/w Cipro and Flagyl  offered to have colonoscopy tomorrow but she would like to wait one more day if not improving plan for colonoscopy tuesday     #)Small amount of bright red bleeding per rectum with small amount of bloody emesis  Hemoglobin stable no further episodes of vomiting or bright red bleeding per rectum  Low suspicions for any active GI bleed  Monitor hemoglobin

## 2023-09-10 NOTE — PROGRESS NOTE PEDS - ATTENDING COMMENTS
Patient examined on morning rounds and I performed my own assessment.     Bautista is a 18 you F admitted for recurrent episodes of abdominal pain, now with intermittent areas of ileitis/colitis on her CT, persistent pain and PO intolerance, elevated inflammatory markers (ESR, CRP) that is most concerning for an inflammatory process and presentation and CT findings can be consistent with inflammatory bowel disease. Symptoms have likely been further exacerbated by her use of Advil.     On exam, she is overall well-appearing but does appear to be in mild discomfort, pleasant and cooperative, MMM, clear oropharynx, PERRL, clear lungs and breathing comfortably, normal cardiac exam, no murmurs rubs or gallops; on abdominal exam she is very tender throughout, even to light palpation, worse in lower quadrants, no HSM, normaoactive BS. She has no bruises rashes or other skin lesions, pulses 2+ bilat, WWP, normal cap refill.     We will continue with IV hydration while PO remains poor, pain control with IV tylenol and morphine (no NSAIDs due to risk of GI irritation). As per adult GI consult recommendations started Cipro/flagyl yesterday in case of infectious etiology, with no improvement. Given lack of imrpovement, GI, would like to proceed with colonoscopy in 1-2 days but bautista would like to allow for one more day of antibiotics to see improves.    I have discussed plan of care with multidisciplinary team, patient and family. All questions addressed.
patient seen and please refer to addendum written same day to the H&P. admitted overnight and seen by myself next morning.

## 2023-09-11 ENCOUNTER — TRANSCRIPTION ENCOUNTER (OUTPATIENT)
Age: 18
End: 2023-09-11

## 2023-09-11 ENCOUNTER — RESULT REVIEW (OUTPATIENT)
Age: 18
End: 2023-09-11

## 2023-09-11 PROCEDURE — 43239 EGD BIOPSY SINGLE/MULTIPLE: CPT

## 2023-09-11 PROCEDURE — 88305 TISSUE EXAM BY PATHOLOGIST: CPT | Mod: 26

## 2023-09-11 PROCEDURE — 88312 SPECIAL STAINS GROUP 1: CPT | Mod: 26

## 2023-09-11 RX ORDER — ACETAMINOPHEN 500 MG
1000 TABLET ORAL EVERY 6 HOURS
Refills: 0 | Status: DISCONTINUED | OUTPATIENT
Start: 2023-09-11 | End: 2023-09-13

## 2023-09-11 RX ORDER — ACETAMINOPHEN 500 MG
1000 TABLET ORAL EVERY 6 HOURS
Refills: 0 | Status: COMPLETED | OUTPATIENT
Start: 2023-09-11 | End: 2023-09-11

## 2023-09-11 RX ORDER — SOD SULF/SODIUM/NAHCO3/KCL/PEG
4000 SOLUTION, RECONSTITUTED, ORAL ORAL ONCE
Refills: 0 | Status: COMPLETED | OUTPATIENT
Start: 2023-09-11 | End: 2023-09-11

## 2023-09-11 RX ADMIN — Medication 500 MILLIGRAM(S): at 22:09

## 2023-09-11 RX ADMIN — Medication 500 MILLIGRAM(S): at 06:26

## 2023-09-11 RX ADMIN — ESCITALOPRAM OXALATE 20 MILLIGRAM(S): 10 TABLET, FILM COATED ORAL at 10:15

## 2023-09-11 RX ADMIN — Medication 400 MILLIGRAM(S): at 12:05

## 2023-09-11 RX ADMIN — Medication 650 MILLIGRAM(S): at 00:06

## 2023-09-11 RX ADMIN — Medication 1000 MILLIGRAM(S): at 12:30

## 2023-09-11 RX ADMIN — MORPHINE SULFATE 6 MILLIGRAM(S): 50 CAPSULE, EXTENDED RELEASE ORAL at 00:16

## 2023-09-11 RX ADMIN — Medication 500 MILLIGRAM(S): at 10:15

## 2023-09-11 RX ADMIN — Medication 650 MILLIGRAM(S): at 06:26

## 2023-09-11 RX ADMIN — MORPHINE SULFATE 6 MILLIGRAM(S): 50 CAPSULE, EXTENDED RELEASE ORAL at 15:13

## 2023-09-11 RX ADMIN — MORPHINE SULFATE 6 MILLIGRAM(S): 50 CAPSULE, EXTENDED RELEASE ORAL at 00:19

## 2023-09-11 RX ADMIN — Medication 650 MILLIGRAM(S): at 00:19

## 2023-09-11 RX ADMIN — MORPHINE SULFATE 6 MILLIGRAM(S): 50 CAPSULE, EXTENDED RELEASE ORAL at 22:10

## 2023-09-11 RX ADMIN — MORPHINE SULFATE 6 MILLIGRAM(S): 50 CAPSULE, EXTENDED RELEASE ORAL at 09:31

## 2023-09-11 RX ADMIN — Medication 1000 MILLIGRAM(S): at 20:59

## 2023-09-11 RX ADMIN — Medication 400 MILLIGRAM(S): at 20:16

## 2023-09-11 RX ADMIN — MORPHINE SULFATE 6 MILLIGRAM(S): 50 CAPSULE, EXTENDED RELEASE ORAL at 21:03

## 2023-09-11 RX ADMIN — MORPHINE SULFATE 6 MILLIGRAM(S): 50 CAPSULE, EXTENDED RELEASE ORAL at 09:30

## 2023-09-11 RX ADMIN — PANTOPRAZOLE SODIUM 40 MILLIGRAM(S): 20 TABLET, DELAYED RELEASE ORAL at 06:26

## 2023-09-11 RX ADMIN — Medication 500 MILLIGRAM(S): at 15:04

## 2023-09-11 RX ADMIN — Medication 5 MILLIGRAM(S): at 22:09

## 2023-09-11 RX ADMIN — Medication 4000 MILLILITER(S): at 22:09

## 2023-09-11 NOTE — DIETITIAN INITIAL EVALUATION ADULT - ORAL INTAKE PTA/DIET HISTORY
Pt in Endoscopy when RD visited. RD will follow up with hx. Notably pt with hx Endoscopy at 11yo for eating disorder (Anorexia).

## 2023-09-11 NOTE — CHART NOTE - NSCHARTNOTEFT_GEN_A_CORE
EGD Impressions:  	Erythema in the stomach compatible with non-erosive gastritis. (Biopsy).  	Grade A esophagitis compatible with nonspecific erosive esophagitis.  	Normal mucosa in the 1st and 2nd part of duodenum. (Biopsy).  PLAN:  Advance diet to clear liquid diet    NPO after midnight for colonoscopy tomorrow    Await patholgies    Ordered Golytely prep and dulcolax    COmmunicated with primary team

## 2023-09-11 NOTE — DIETITIAN INITIAL EVALUATION ADULT - PERTINENT MEDS FT
MEDICATIONS  (STANDING):  acetaminophen     Tablet .. 650 milliGRAM(s) Oral every 6 hours  ciprofloxacin     Tablet 500 milliGRAM(s) Oral every 12 hours  dextrose 5% + sodium chloride 0.9%. 1000 milliLiter(s) (100 mL/Hr) IV Continuous <Continuous>  escitalopram 20 milliGRAM(s) Oral daily  melatonin 5 milliGRAM(s) Oral at bedtime  metroNIDAZOLE    Tablet 500 milliGRAM(s) Oral every 8 hours  pantoprazole    Tablet 40 milliGRAM(s) Oral before breakfast    MEDICATIONS  (PRN):  lidocaine/prilocaine Cream 1 Application(s) Topical once PRN for venipuncture  morphine  - Injectable 6 milliGRAM(s) IV Push every 4 hours PRN Moderate Pain (4 - 6)  ondansetron Injectable 4 milliGRAM(s) IV Push every 8 hours PRN Nausea and/or Vomiting

## 2023-09-11 NOTE — DIETITIAN INITIAL EVALUATION ADULT - NUTRITIONGOAL OUTCOME1
Pt will meet >50% estimated needs in 4 days. Pt deemed high risk; RD to follow in 4 days.  Monitor diet order, kcal intake, body composition, NFPE, labs  (lytes, BG, renal indices)

## 2023-09-11 NOTE — PROGRESS NOTE PEDS - SUBJECTIVE AND OBJECTIVE BOX
18 year old female presents with diffuse abdominal pain found to have colitis on CT admitted for pain control and colonoscopy.  (10 Sep 2023 13:42)    INTERVAL/OVERNIGHT EVENTS:  Patient describes generalized sharp and achy abdominal pain this AM, rated 8.5/10. Stated last bowel motion was 3 days ago, however denies any abdominal distension and states passing flatus. Given melatonin last night for sleep aide. Patient states is drinking well, however reports poor oral intake.     PAST MEDICAL & SURGICAL HISTORY:  - History of ovarian cyst  - Chronic inflammatory small bowel disease    No significant past surgical history    FAMILY HISTORY:    MEDICATIONS, ALLERGIES, & DIET:  MEDICATIONS  (STANDING):  acetaminophen     Tablet .. 650 milliGRAM(s) Oral every 6 hours  ciprofloxacin     Tablet 500 milliGRAM(s) Oral every 12 hours  dextrose 5% + sodium chloride 0.9%. 1000 milliLiter(s) (100 mL/Hr) IV Continuous <Continuous>  escitalopram 20 milliGRAM(s) Oral daily  melatonin 5 milliGRAM(s) Oral at bedtime  metroNIDAZOLE    Tablet 500 milliGRAM(s) Oral every 8 hours  pantoprazole    Tablet 40 milliGRAM(s) Oral before breakfast    MEDICATIONS  (PRN):  lidocaine/prilocaine Cream 1 Application(s) Topical once PRN for venipuncture  morphine  - Injectable 6 milliGRAM(s) IV Push every 4 hours PRN Moderate Pain (4 - 6)  ondansetron Injectable 4 milliGRAM(s) IV Push every 8 hours PRN Nausea and/or Vomiting    Allergies    No Known Allergies    Intolerances    VITALS, INTAKE/OUTPUT:  Vital Signs Last 24 Hrs  T(C): 36.7 (11 Sep 2023 08:09), Max: 37.1 (10 Sep 2023 19:37)  T(F): 98 (11 Sep 2023 08:09), Max: 98.7 (10 Sep 2023 19:37)  HR: 54 (11 Sep 2023 08:09) (54 - 81)  BP: 95/60 (11 Sep 2023 08:09) (91/62 - 103/69)  BP(mean): 72 (11 Sep 2023 08:09) (70 - 82)  RR: 18 (11 Sep 2023 08:09) (18 - 20)  SpO2: 99% (11 Sep 2023 08:09) (96% - 100%)    Parameters below as of 11 Sep 2023 08:09  Patient On (Oxygen Delivery Method): room air    Daily     Daily   BMI (kg/m2): 30.6 (09-08 @ 22:00)    I&O's Summary    10 Sep 2023 07:01  -  11 Sep 2023 07:00  --------------------------------------------------------  IN: 1300 mL / OUT: 0 mL / NET: 1300 mL        PHYSICAL EXAM:  VS reviewed, stable and appropriate for age.   Gen: patient is alert, interactive, well appearing, no acute distress  HEENT: NC/AT, pupils equal, responsive, no conjunctivitis or scleral icterus; no nasal discharge  Chest: CTA b/l, no crackles/wheezes, good air entry, no tachypnea or retractions  CV: regular rate and rhythm, no murmurs   Abd: soft, generalized tenderness, nondistended, no HSM appreciated, hypoactive bowel sounds  Extrem: WWP.   Neuro: Grossly intact     INTERVAL LAB RESULTS:                        12.8   7.71  )-----------( 357      ( 08 Sep 2023 08:50 )             39.1       UCx     INTERVAL IMAGING STUDIES:    < from: CT Abdomen and Pelvis w/ Oral Cont and w/ IV Cont (09.08.23 @ 13:22) >  IMPRESSION:    Mild scattered regions of colonic wall thickening and terminal ileal wall   thickening compatible with colitis/enteritis of infectious or   inflammatory origin.    < end of copied text >

## 2023-09-11 NOTE — DIETITIAN INITIAL EVALUATION ADULT - NSFNSGIIOFT_GEN_A_CORE
I&O's Detail    10 Sep 2023 07:01  -  11 Sep 2023 07:00  --------------------------------------------------------  IN:    dextrose 5% + sodium chloride 0.9%: 1300 mL  Total IN: 1300 mL    OUT:  Total OUT: 0 mL    Total NET: 1300 mL

## 2023-09-11 NOTE — PROGRESS NOTE PEDS - ASSESSMENT
18 year old female presenting with diffuse abdominal pain and vomiting, admitted for pain control found to have  ileitis/colitis on CT.  Vitals signs stable and appropriate for age. PE remarkable for generalized abdominal pain, and tenderness to palpation. ESR and CRP noted to be elevated. CT abdomen showed mild scattered regions of colonic wall thickening consistent with colitits/enteritis. Will continue antibiotics and pain medications for pain control. Pending colonoscopy as per GI, which is planned for tomorrow.     PLAN:  RESP:  - RA    CVS:  - HDS    FENGI:  - Clear fluids diet   - Strict Is/Os  - D5NS at M  - Zofran 4 mg IV q8h RI/N   - Pantoprazole 40mg QD in the morning   - GI consulted    ID:  - Tylenol 650 PO q6 ATC   - PO Ciprofloxacin 500mg q12h (9/9-)D3  - PO Flagyl 500mg q8h (9/9-) D3  - Morphine 6mg IV PRN q4h  - NO NSAIDS    PSYCH:   - Lexapro 20mg qdaily (home med)  - Melatonin 5 mg QHS  18 year old female presenting with diffuse abdominal pain and vomiting, admitted for pain control found to have  ileitis/colitis on CT.  Vitals signs stable and appropriate for age. PE remarkable for generalized abdominal pain, and tenderness to palpation. ESR and CRP noted to be elevated. CT abdomen showed mild scattered regions of colonic wall thickening consistent with colitits/enteritis. Will continue antibiotics and pain medications for pain control. Pending endoscopy today as per GI, for colonoscopy which is planned for tomorrow.     PLAN:  RESP:  - RA    CVS:  - HDS    FENGI:  - Clear fluids diet   - Strict Is/Os  - D5NS at M  - Zofran 4 mg IV q8h OR/N   - Pantoprazole 40mg QD in the morning   - GI consulted  - EGD today  -Colonoscopy tomorrow     ID:  - Tylenol 650 PO q6 ATC   - PO Ciprofloxacin 500mg q12h (9/9-)D3  - PO Flagyl 500mg q8h (9/9-) D3  - Morphine 6mg IV PRN q4h  - NO NSAIDS    PSYCH:   - Lexapro 20mg qdaily (home med)  - Melatonin 5 mg QHS

## 2023-09-11 NOTE — DIETITIAN INITIAL EVALUATION ADULT - OTHER INFO
Pt presented with diffuse abdominal pain admitted for pain control. S/p EGD today: EGD Impressions:  	Erythema in the stomach compatible with non-erosive gastritis. (Biopsy).  	Grade A esophagitis compatible with nonspecific erosive esophagitis.  	Normal mucosa in the 1st and 2nd part of duodenum. (Biopsy).  Pt for NPO after midnight for colonoscopy tomorrow.

## 2023-09-12 LAB — GI PCR PANEL: SIGNIFICANT CHANGE UP

## 2023-09-12 RX ORDER — POLYETHYLENE GLYCOL 3350 17 G/17G
238 POWDER, FOR SOLUTION ORAL ONCE
Refills: 0 | Status: COMPLETED | OUTPATIENT
Start: 2023-09-12 | End: 2023-09-12

## 2023-09-12 RX ADMIN — SODIUM CHLORIDE 100 MILLILITER(S): 9 INJECTION, SOLUTION INTRAVENOUS at 23:22

## 2023-09-12 RX ADMIN — MORPHINE SULFATE 6 MILLIGRAM(S): 50 CAPSULE, EXTENDED RELEASE ORAL at 01:19

## 2023-09-12 RX ADMIN — Medication 500 MILLIGRAM(S): at 23:05

## 2023-09-12 RX ADMIN — Medication 1000 MILLIGRAM(S): at 09:30

## 2023-09-12 RX ADMIN — PANTOPRAZOLE SODIUM 40 MILLIGRAM(S): 20 TABLET, DELAYED RELEASE ORAL at 06:26

## 2023-09-12 RX ADMIN — Medication 500 MILLIGRAM(S): at 09:40

## 2023-09-12 RX ADMIN — Medication 5 MILLIGRAM(S): at 23:06

## 2023-09-12 RX ADMIN — ONDANSETRON 4 MILLIGRAM(S): 8 TABLET, FILM COATED ORAL at 02:15

## 2023-09-12 RX ADMIN — Medication 400 MILLIGRAM(S): at 13:35

## 2023-09-12 RX ADMIN — Medication 1000 MILLIGRAM(S): at 02:19

## 2023-09-12 RX ADMIN — ESCITALOPRAM OXALATE 20 MILLIGRAM(S): 10 TABLET, FILM COATED ORAL at 09:40

## 2023-09-12 RX ADMIN — Medication 1000 MILLIGRAM(S): at 21:20

## 2023-09-12 RX ADMIN — POLYETHYLENE GLYCOL 3350 238 GRAM(S): 17 POWDER, FOR SOLUTION ORAL at 16:56

## 2023-09-12 RX ADMIN — Medication 20 MILLIGRAM(S): at 23:04

## 2023-09-12 RX ADMIN — MORPHINE SULFATE 6 MILLIGRAM(S): 50 CAPSULE, EXTENDED RELEASE ORAL at 02:13

## 2023-09-12 RX ADMIN — Medication 400 MILLIGRAM(S): at 02:06

## 2023-09-12 RX ADMIN — Medication 400 MILLIGRAM(S): at 08:56

## 2023-09-12 RX ADMIN — Medication 1000 MILLIGRAM(S): at 14:00

## 2023-09-12 RX ADMIN — Medication 500 MILLIGRAM(S): at 06:25

## 2023-09-12 RX ADMIN — ONDANSETRON 4 MILLIGRAM(S): 8 TABLET, FILM COATED ORAL at 22:24

## 2023-09-12 RX ADMIN — Medication 500 MILLIGRAM(S): at 23:06

## 2023-09-12 RX ADMIN — Medication 400 MILLIGRAM(S): at 20:49

## 2023-09-12 RX ADMIN — Medication 500 MILLIGRAM(S): at 13:35

## 2023-09-12 NOTE — PROGRESS NOTE PEDS - SUBJECTIVE AND OBJECTIVE BOX
19 yo presents with diffuse abdominal pain found to have colitis on CT admitted for pain control .Found to have gastritis on EGD. (11 Sep 2023 14:56)    INTERVAL/OVERNIGHT EVENTS:  Patient had a vomiting episode overnight. Patient was suppose to have colonoscopy today, however patient unable to finish [employer] prep. Patient states that generalized abdominal pain remains unchanged, rate 8/10 being managed with morphine IV and tylenol IV. Patient had a bowel motion yesterday.     PAST MEDICAL & SURGICAL HISTORY:  History of ovarian cyst  Chronic inflammatory small bowel disease    No significant past surgical history    FAMILY HISTORY:      MEDICATIONS, ALLERGIES, & DIET:  MEDICATIONS  (STANDING):  acetaminophen   IVPB .. 1000 milliGRAM(s) IV Intermittent every 6 hours  ciprofloxacin     Tablet 500 milliGRAM(s) Oral every 12 hours  dextrose 5% + sodium chloride 0.9%. 1000 milliLiter(s) (100 mL/Hr) IV Continuous <Continuous>  escitalopram 20 milliGRAM(s) Oral daily  melatonin 5 milliGRAM(s) Oral at bedtime  metroNIDAZOLE    Tablet 500 milliGRAM(s) Oral every 8 hours  pantoprazole    Tablet 40 milliGRAM(s) Oral before breakfast    MEDICATIONS  (PRN):  lidocaine/prilocaine Cream 1 Application(s) Topical once PRN for venipuncture  morphine  - Injectable 6 milliGRAM(s) IV Push every 4 hours PRN Moderate Pain (4 - 6)  ondansetron Injectable 4 milliGRAM(s) IV Push every 8 hours PRN Nausea and/or Vomiting    Allergies    No Known Allergies    Intolerances        VITALS, INTAKE/OUTPUT:  Vital Signs Last 24 Hrs  T(C): 36.9 (12 Sep 2023 07:40), Max: 36.9 (12 Sep 2023 04:12)  T(F): 98.4 (12 Sep 2023 07:40), Max: 98.4 (12 Sep 2023 04:12)  HR: 67 (12 Sep 2023 07:40) (59 - 77)  BP: 91/44 (12 Sep 2023 07:40) (91/44 - 128/72)  BP(mean): 63 (12 Sep 2023 07:40) (63 - 88)  RR: 20 (12 Sep 2023 07:40) (18 - 20)  SpO2: 98% (12 Sep 2023 07:40) (96% - 99%)    Parameters below as of 12 Sep 2023 07:40  Patient On (Oxygen Delivery Method): room air        Daily Height in cm: 154.94 (11 Sep 2023 14:56)    Daily   BMI (kg/m2): 28.3 (09-11 @ 14:56)    I&O's Summary    11 Sep 2023 07:01  -  12 Sep 2023 07:00  --------------------------------------------------------  IN: 1800 mL / OUT: 0 mL / NET: 1800 mL        PHYSICAL EXAM:  VS reviewed, stable and appropriate for age.  Gen: patient is alert, interactive, no acute distress  HEENT: NC/AT, pupils equal, responsive, no nasal discharge or congestion. OP without exudates/erythema.   Chest: CTA b/l, no crackles/wheezes, good air entry, no tachypnea or retractions  CV: regular rate and rhythm, no murmurs   Abd: soft, generalized tenderness to palpation, nondistended, no HSM appreciated, +BS  Extrem: No joint effusion or tenderness; FROM of all joints; no deformities or erythema noted. 2+ peripheral pulses, WWP.   Neuro: Grossly intact    INTERVAL LAB RESULTS:    UCx     INTERVAL IMAGING STUDIES:

## 2023-09-12 NOTE — PROGRESS NOTE PEDS - ASSESSMENT
17 yo presents with diffuse abdominal pain found to have colitis on CT admitted for pain control, also found to have gastritis on EGD. Vitals signs stable, appropriate for age. PE remarkable for generalized abdominal pain, unchanged from previous. CRP and ESR elevated, at 4.3 and 40 respectively. Awaiting FOBT, fecal calprotectin, GI PCR, and O+P results.  EGD revealed erythema in the stomach compatible with non-erosive gastritis, and nonspecific erosive esophagitis. Will clarify with GI, treatment reccomendations based on EGD results. Pending colonoscopy as per GI. Continue current antibiotics regime.     PLAN:    RESP:  - RA    CVS:  - HDS    FENGI:  - Clear fluids diet   - Strict Is/Os  - D5NS at M  - Zofran 4 mg IV q8h NV/N   - Pantoprazole 40mg QD in the morning   - Adult GI consulted    ID:  -  Tylenol 1000 mg IV q6hrs ( /11doses)  - PO Ciprofloxacin 500mg q12h (9/9-)D3  - PO Flagyl 500mg q8h (9/9-) D3  - Morphine 6mg IV PRN q4h  - s/p Tylenol 650 PO q6 ATC   - NO NSAIDS    PSYCH:   - Lexapro 20mg qdaily (home med)  - Melatonin 5 mg QHS

## 2023-09-12 NOTE — CHART NOTE - NSCHARTNOTEFT_GEN_A_CORE
Patient was not able to finish Golytely preparation.   Colonoscopy will be cancelled today     PLAN   Scheduled colonoscopy on Thursday   Clear liquids diet today and tomorrow.   14 pkts of Golytely starting tomorrow in am   20 mg of dulcolax tomorrow at bedtime   NPO Wednesday after midnight   Will follow Patient was not able to finish Golytely preparation.   Colonoscopy will be cancelled today     PLAN   Scheduled colonoscopy tomorrow   Clear liquids diet today   14 pkts of Miralax today   20 mg of dulcolax at bedtime   NPO after midnight   Will follow

## 2023-09-13 ENCOUNTER — TRANSCRIPTION ENCOUNTER (OUTPATIENT)
Age: 18
End: 2023-09-13

## 2023-09-13 ENCOUNTER — RESULT REVIEW (OUTPATIENT)
Age: 18
End: 2023-09-13

## 2023-09-13 VITALS
RESPIRATION RATE: 18 BRPM | SYSTOLIC BLOOD PRESSURE: 98 MMHG | OXYGEN SATURATION: 99 % | DIASTOLIC BLOOD PRESSURE: 63 MMHG | HEART RATE: 71 BPM | TEMPERATURE: 98 F

## 2023-09-13 LAB — SURGICAL PATHOLOGY STUDY: SIGNIFICANT CHANGE UP

## 2023-09-13 PROCEDURE — 45380 COLONOSCOPY AND BIOPSY: CPT

## 2023-09-13 RX ORDER — PANTOPRAZOLE SODIUM 20 MG/1
1 TABLET, DELAYED RELEASE ORAL
Qty: 11 | Refills: 0
Start: 2023-09-13 | End: 2023-09-23

## 2023-09-13 RX ORDER — ESCITALOPRAM OXALATE 10 MG/1
1 TABLET, FILM COATED ORAL
Qty: 0 | Refills: 0 | DISCHARGE
Start: 2023-09-13

## 2023-09-13 RX ADMIN — Medication 400 MILLIGRAM(S): at 02:31

## 2023-09-13 RX ADMIN — Medication 1000 MILLIGRAM(S): at 08:27

## 2023-09-13 RX ADMIN — ESCITALOPRAM OXALATE 20 MILLIGRAM(S): 10 TABLET, FILM COATED ORAL at 09:13

## 2023-09-13 RX ADMIN — Medication 1000 MILLIGRAM(S): at 03:05

## 2023-09-13 RX ADMIN — PANTOPRAZOLE SODIUM 40 MILLIGRAM(S): 20 TABLET, DELAYED RELEASE ORAL at 06:57

## 2023-09-13 RX ADMIN — MORPHINE SULFATE 6 MILLIGRAM(S): 50 CAPSULE, EXTENDED RELEASE ORAL at 00:30

## 2023-09-13 RX ADMIN — Medication 500 MILLIGRAM(S): at 06:57

## 2023-09-13 RX ADMIN — Medication 500 MILLIGRAM(S): at 09:13

## 2023-09-13 RX ADMIN — Medication 500 MILLIGRAM(S): at 14:10

## 2023-09-13 RX ADMIN — SODIUM CHLORIDE 100 MILLILITER(S): 9 INJECTION, SOLUTION INTRAVENOUS at 10:56

## 2023-09-13 RX ADMIN — MORPHINE SULFATE 6 MILLIGRAM(S): 50 CAPSULE, EXTENDED RELEASE ORAL at 00:02

## 2023-09-13 RX ADMIN — Medication 400 MILLIGRAM(S): at 08:26

## 2023-09-13 NOTE — CHART NOTE - NSCHARTNOTEFT_GEN_A_CORE
PACU ANESTHESIA ADMISSION NOTE      Procedure:   Post op diagnosis:      ____  Intubated  TV:______       Rate: ______      FiO2: ______    __x__  Patent Airway    __x__  Full return of protective reflexes    __x__  Full recovery from anesthesia / back to baseline status    Vitals:  T(C): 36.3 (09-13-23 @ 11:44), Max: 36.9 (09-12-23 @ 23:07)  HR: 72 (09-13-23 @ 11:44) (62 - 92)  BP: 125/77 (09-13-23 @ 11:44) (93/57 - 125/77)  RR: 18 (09-13-23 @ 11:44) (18 - 20)  SpO2: 99% (09-13-23 @ 11:44) (96% - 99%)    Mental Status:  __x__ Awake   ___x__ Alert   _____ Drowsy   _____ Sedated    Nausea/Vomiting:  __x__ NO  ______Yes,   See Post - Op Orders          Pain Scale (0-10):  _____    Treatment: ____ None    __x__ See Post - Op/PCA Orders    Post - Operative Fluids:   ____ Oral   __x__ See Post - Op Orders    Plan: Discharge:   ____Home       _x____Floor     _____Critical Care    _____  Other:_________________    Comments: Patient had smooth intraoperative event, no anesthesia complication.  PACU Vital signs: HR:  76          BP:   111     /    68      RR:  17           O2 Sat: 100      %     Temp 98

## 2023-09-13 NOTE — PRE-ANESTHESIA EVALUATION ADULT - NSANTHDISPORD_GEN_ALL_CORE
Current Discharge Medication List  
  
Take these medications at their scheduled times Dose & Instructions Dispensing Information Comments Morning Noon Evening Bedtime  
 amLODIPine 5 mg tablet Commonly known as:  Tad Pema Your next dose is: Today, Tomorrow Other:  ____________ Dose:  5 mg Take 1 Tab by mouth daily. Quantity:  30 Tab Refills:  3  
     
   
   
   
  
 aspirin 81 mg chewable tablet Your next dose is: Today, Tomorrow Other:  ____________ Dose:  81 mg Take 1 Tab by mouth daily. Quantity:  90 Tab Refills:  3  
     
   
   
   
  
 atorvastatin 80 mg tablet Commonly known as:  LIPITOR Your next dose is: Today, Tomorrow Other:  ____________ Dose:  80 mg Take 1 Tab by mouth daily. Quantity:  90 Tab Refills:  3  
     
   
   
   
  
 carvedilol 25 mg tablet Commonly known as:  Sindhu Laura Your next dose is: Today, Tomorrow Other:  ____________ Dose:  25 mg Take 1 Tab by mouth two (2) times daily (with meals). Quantity:  60 Tab Refills:  0  
     
   
   
   
  
 clopidogrel 75 mg Tab Commonly known as:  PLAVIX Your next dose is: Today, Tomorrow Other:  ____________ Dose:  75 mg Take 1 Tab by mouth daily. Quantity:  30 Tab Refills:  0  
     
   
   
   
  
 cyanocobalamin 1,000 mcg/mL injection Commonly known as:  VITAMIN B12 Your next dose is: Today, Tomorrow Other:  ____________ Dose:  1000 mcg 1 mL by IntraMUSCular route every seven (7) days. Quantity:  10 Vial  
Refills:  3  
     
   
   
   
  
 doxycycline 100 mg tablet Commonly known as:  ADOXA Your next dose is: Today, Tomorrow Other:  ____________ Dose:  100 mg Take 1 Tab by mouth two (2) times a day. Quantity:  7 Tab Refills:  0  
     
   
   
   
  
 ergocalciferol 50,000 unit capsule
Commonly known as:  ERGOCALCIFEROL Your next dose is: Today, Tomorrow Other:  ____________ Dose:  [id number] Units Take 1 Cap by mouth every seven (7) days. Quantity:  4 Cap Refills:  3  
     
   
   
   
  
 iron fum,bh-[id number]-FA-Ca-succ tablet Commonly known as:  Lawrnce Latin Your next dose is: Today, Tomorrow Other:  ____________ Dose:  1 Tab Take 1 Tab by mouth two (2) times a day. Quantity:  60 Tab Refills:  3 LEVEMIR FLEXPEN 100 unit/mL (3 mL) Inpn Generic drug:  insulin detemir Your next dose is: Today, Tomorrow Other:  ____________ Dose:  22 Units 22 Units by SubCUTAneous route daily. Refills:  0  
     
   
   
   
  
 losartan 50 mg tablet Commonly known as:  COZAAR Your next dose is: Today, Tomorrow Other:  ____________ Dose:  50 mg Take 1 Tab by mouth daily. Quantity:  90 Tab Refills:  3  
     
   
   
   
  
 melatonin 3 mg tablet Your next dose is: Today, Tomorrow Other:  ____________ Dose:  3 mg Take 1 Tab by mouth nightly. Quantity:  90 Tab Refills:  3  
     
   
   
   
  
 oseltamivir 30 mg capsule Commonly known as:  TAMIFLU Your next dose is: Today, Tomorrow Other:  ____________ Dose:  30 mg Take 1 Cap by mouth two (2) times a day. Quantity:  6 Cap Refills:  0  
     
   
   
   
  
 predniSONE 20 mg tablet Commonly known as:  Lavinia Eriberto Your next dose is: Today, Tomorrow Other:  ____________ Dose:  20 mg Take 1 Tab by mouth daily. Take 40 mg po daily x 2 days. Then 20 mg (1 tab) po daily x 3 days. The 10 mg (1/2 tab) po daily x 4 days Quantity:  9 Tab Refills:  0  
     
   
   
   
  
 spironolactone 25 mg tablet Commonly known as:  ALDACTONE Your next dose is: Today, Tomorrow Other:  ____________  Dose:  25 mg  

Take 1 Tab by mouth daily. Quantity:  30 Tab Refills:  3 Take these medications as needed Dose & Instructions Dispensing Information Comments Morning Noon Evening Bedtime  
 albuterol-ipratropium 2.5 mg-0.5 mg/3 ml Nebu Commonly known as:  Jorgito Bijan Your next dose is: Today, Tomorrow Other:  ____________ Dose:  3 mL  
3 mL by Nebulization route every six (6) hours as needed. Quantity:  120 Nebule Refills:  3  
     
   
   
   
  
 benzonatate 100 mg capsule Commonly known as:  TESSALON Your next dose is: Today, Tomorrow Other:  ____________ Dose:  100 mg Take 1 Cap by mouth three (3) times daily as needed for Cough for up to 7 days. Quantity:  21 Cap Refills:  0  
     
   
   
   
  
 oxyCODONE-acetaminophen 5-325 mg per tablet Commonly known as:  PERCOCET Your next dose is: Today, Tomorrow Other:  ____________ Dose:  1 Tab Take 1 Tab by mouth every four (4) hours as needed. Max Daily Amount: 6 Tabs. Quantity:  8 Tab Refills:  0 Take these medications as directed Dose & Instructions Dispensing Information Comments Morning Noon Evening Bedtime  
 bumetanide 1 mg tablet Commonly known as:  Marisue Days Your next dose is: Today, Tomorrow Other:  ____________ TAKE 1 TABLET BY MOUTH EVERY 24 HOURS Refills:  6  
     
   
   
   
  
 isosorbide mononitrate ER 60 mg CR tablet Commonly known as:  IMDUR Your next dose is: Today, Tomorrow Other:  ____________ TAKE ONE TABLET BY MOUTH EVERY DAY Quantity:  30 Tab Refills:  5  
     
   
   
   
  
 metOLazone 2.5 mg tablet Commonly known as:  Fabiene Solid Your next dose is: Today, Tomorrow Other:  ____________ TAKE 1 TAB BY MOUTH DAILY.  Refills:  0  
     
   
   
   
  
 nicotine 21 mg/24 hr  
Commonly known as:  Karl Service  

Your next dose is: Today, Tomorrow Other:  ____________ Dose:  1 Patch 1 Patch. Refills:  0 Where to Get Your Medications Information about where to get these medications is not yet available ! Ask your nurse or doctor about these medications  
  benzonatate 100 mg capsule  
 doxycycline 100 mg tablet  
 oseltamivir 30 mg capsule  
 oxyCODONE-acetaminophen 5-325 mg per tablet  
 predniSONE 20 mg tablet
PACU
PACU

## 2023-09-13 NOTE — DISCHARGE NOTE NURSING/CASE MANAGEMENT/SOCIAL WORK - NSDCPEFALRISK_GEN_ALL_CORE
For information on Fall & Injury Prevention, visit: [email or web address] OR  [email or web address] OR  https://www.cdc.gov/steadi/patient.html

## 2023-09-13 NOTE — CHART NOTE - NSCHARTNOTEFT_GEN_A_CORE
Findings:  Normal mucosa was noted in the terminal ileum.	  Normal mucosa was noted in the whole colon. There were no AVMs, diverticula, polyps, masses, evidence of colitis or other abnormalities seen. Retroflexion of the scope in the rectum revealed no abnormalities. Multiple cold forceps biopsies were performed for histology in the ascending colon, descending colon, transverse colon and rectum.	  The colon was otherwise unremarkable.    PLAN:     Follow-up office visit in 2-3 weeks  Resume Diet.   Avoid Constipation.   Miralax QD PRN for constipation.   Can use Bentyl 20 mg q6 for abdominal pain.   Await pathology results. Findings:  Normal mucosa was noted in the terminal ileum.	  Normal mucosa was noted in the whole colon. There were no AVMs, diverticula, polyps, masses, evidence of colitis or other abnormalities seen. Retroflexion of the scope in the rectum revealed no abnormalities. Multiple cold forceps biopsies were performed for histology in the ascending colon, descending colon, transverse colon and rectum.	  The colon was otherwise unremarkable.    PLAN:     Follow-up office visit in 2-3 weeks  Resume Diet.   Avoid Constipation.   Miralax QD PRN for constipation.   Can use Bentyl 20 mg q6 for abdominal pain.   Await pathology results.   Call as needed.

## 2023-09-13 NOTE — PROGRESS NOTE PEDS - SUBJECTIVE AND OBJECTIVE BOX
19 yo presents with diffuse abdominal pain found to have colitis on CT admitted for pain control .Found to have gastritis on EGD (12 Sep 2023 10:45)    INTERVAL/OVERNIGHT EVENTS:  No acute events overnight. Patient reports feeling nauseous, related to miralax bowel prep. Was managed with zofran IV. Patient states abdominal pain is improving, denies any discomfort this morning. Voiding and stooling appropriately.     PAST MEDICAL & SURGICAL HISTORY:  History of ovarian cyst  Chronic inflammatory small bowel disease  No significant past surgical history    FAMILY HISTORY:      MEDICATIONS, ALLERGIES, & DIET:  MEDICATIONS  (STANDING):  acetaminophen   IVPB .. 1000 milliGRAM(s) IV Intermittent every 6 hours  ciprofloxacin     Tablet 500 milliGRAM(s) Oral every 12 hours  dextrose 5% + sodium chloride 0.9%. 1000 milliLiter(s) (100 mL/Hr) IV Continuous <Continuous>  escitalopram 20 milliGRAM(s) Oral daily  melatonin 5 milliGRAM(s) Oral at bedtime  metroNIDAZOLE    Tablet 500 milliGRAM(s) Oral every 8 hours  pantoprazole    Tablet 40 milliGRAM(s) Oral before breakfast    MEDICATIONS  (PRN):  lidocaine/prilocaine Cream 1 Application(s) Topical once PRN for venipuncture  morphine  - Injectable 6 milliGRAM(s) IV Push every 4 hours PRN Moderate Pain (4 - 6)  ondansetron Injectable 4 milliGRAM(s) IV Push every 8 hours PRN Nausea and/or Vomiting    Allergies    No Known Allergies    Intolerances    VITALS, INTAKE/OUTPUT:  Vital Signs Last 24 Hrs  T(C): 36.8 (13 Sep 2023 07:58), Max: 36.9 (12 Sep 2023 23:07)  T(F): 98.2 (13 Sep 2023 07:58), Max: 98.5 (12 Sep 2023 23:07)  HR: 62 (13 Sep 2023 07:58) (62 - 92)  BP: 111/61 (13 Sep 2023 07:58) (93/57 - 119/71)  BP(mean): 78 (13 Sep 2023 07:58) (70 - 89)  RR: 20 (13 Sep 2023 07:58) (18 - 20)  SpO2: 99% (13 Sep 2023 07:58) (96% - 100%)    Parameters below as of 12 Sep 2023 19:23  Patient On (Oxygen Delivery Method): room air      Daily     Daily   BMI (kg/m2): 28.3 (09-11 @ 14:56)    I&O's Summary    12 Sep 2023 07:01  -  13 Sep 2023 07:00  --------------------------------------------------------  IN: 2100 mL / OUT: 0 mL / NET: 2100 mL      PHYSICAL EXAM:  VS reviewed, stable and appropriate for age.   Gen: patient is alert, interactive, no acute distress  HEENT: NC/AT, pupils equal, responsive, reactive to light and accomodation, no conjunctivitis or scleral icterus; no nasal discharge or congestion. OP without exudates/erythema.   Chest: CTA b/l, no crackles/wheezes, good air entry, no tachypnea or retractions  CV: regular rate and rhythm, no murmurs   Abd: soft, tenderness to abdominal palpation, nondistended, no HSM appreciated, +BS  Extrem: No joint effusion or tenderness; FROM of all joints; no deformities or erythema noted. 2+ peripheral pulses, WWP.   Neuro: Grossly intact    INTERVAL LAB RESULTS:    UCx     INTERVAL IMAGING STUDIES:

## 2023-09-13 NOTE — DISCHARGE NOTE NURSING/CASE MANAGEMENT/SOCIAL WORK - PATIENT PORTAL LINK FT
You can access the FollowMyHealth Patient Portal offered by [facility] by registering at the following website: [email or web address]. By joining [facility]’s FollowMyHealth portal, you will also be able to view your health information using other applications (apps) compatible with our system.

## 2023-09-13 NOTE — PRE-ANESTHESIA EVALUATION ADULT - NSANTHASARD_GEN_ALL_CORE
Problem: Falls - Risk of  Goal: *Absence of Falls  Description: Document Vahid Mary Fall Risk and appropriate interventions in the flowsheet.   Outcome: Progressing Towards Goal  Note: Fall Risk Interventions:            Medication Interventions: Patient to call before getting OOB,Teach patient to arise slowly
2
2

## 2023-09-13 NOTE — PROGRESS NOTE PEDS - ASSESSMENT
17 yo presents with diffuse abdominal pain found to have colitis on CT admitted for pain control also found to have gastritis on EGD. Vitals signs stable and appropriate for age. PE remarkable for tenderness to palpation, overall improved from days prior. FOBT, GI PCR, and stool culture reported as negative. Patient clinically well-appearing. Pending colonoscopy today. For possible discharge today, if remains clinically stable.     PLAN:  RESP:  - RA    CVS:  - HDS    FENGI:  - NPO since midnight  - Strict Is/Os  - D5NS at M  - Zofran 4 mg IV q8h PRN   - Pantoprazole 40mg QD in the morning   - GI consult    ID:  -  Tylenol 1000 mg IV q6hrs (5 /11doses)  - PO Ciprofloxacin 500mg q12h (9/9-)D4  - PO Flagyl 500mg q8h (9/9-) D4  - Morphine 6mg IV PRN q4h  - s/p Tylenol 650 PO q6 ATC   - NO NSAIDS    PSYCH:   - Lexapro 20mg qdaily (home med)  - Melatonin 5 mg QHS

## 2023-09-14 LAB
CALPROTECTIN STL-MCNT: 16 UG/G — SIGNIFICANT CHANGE UP (ref 0–120)
CULTURE RESULTS: SIGNIFICANT CHANGE UP
SPECIMEN SOURCE: SIGNIFICANT CHANGE UP
SURGICAL PATHOLOGY STUDY: SIGNIFICANT CHANGE UP

## 2023-09-19 DIAGNOSIS — K22.10 ULCER OF ESOPHAGUS WITHOUT BLEEDING: ICD-10-CM

## 2023-09-19 DIAGNOSIS — A09 INFECTIOUS GASTROENTERITIS AND COLITIS, UNSPECIFIED: ICD-10-CM

## 2023-09-19 DIAGNOSIS — R10.9 UNSPECIFIED ABDOMINAL PAIN: ICD-10-CM

## 2023-09-19 DIAGNOSIS — K29.70 GASTRITIS, UNSPECIFIED, WITHOUT BLEEDING: ICD-10-CM

## 2023-09-30 NOTE — ED PROVIDER NOTE - NS ED MD DISPO DISCHARGE
Send Summary:   Discharge Summary Providers:  Provider Role Provider Name   · Attending Jamar Travis   · Referring Henry Leroy   · Consulting Mango Wylie   · Primary Henry Leroy       Note Recipients: Henry Leroy DO Sabo, Frank, MD       Discharge:    Summary:   Admission Date: .26-Jul-2023 05:14:00   Discharge Date: 27-Jul-2023   Attending Physician at Discharge: Jamar Travis   Admission Reason: Right Hip Arthoplasty (1)   Final Discharge Diagnoses: Hip pain   Procedures: Date: 26-Jul-2023 08:07:00  Procedure Name: 1. RIGHT HIP HARDWARE REMOVAL (deep and buried)   Condition at Discharge: Satisfactory   Disposition at Discharge: .Home   Vital Signs:        T   P  R  BP   MAP  SpO2   Value  36.4  50  16  140/67   96  98%  Date/Time 7/27 7:46 7/27 7:46 7/27 7:46 7/27 7:46  7/27 7:46 7/27 7:46  Range  (36C - 36.4C )  (50 - 58 )  (16 - 18 )  (132 - 177 )/ (65 - 85 )  (96 - 122 )  (93% - 98% )    Date:            Weight/Scale Type:  Height:   26-Jul-2023 06:24  87.1  kg / standing       Hospital Course:                                                  Discharge summary  This patient  was admitted to the hospital on 7/26/23  after undergoing rt hip hardware removal  without complications that morning.    During the postoperative period, while in hospital, patient was medically managed by the hospitalist. Please see medial notes and H&P for patients additional diagnoses.  Ortho agrees with all medical diagnoses and treatments while patient in hospital.  No significant or unexpected findings or abnormal ortho imaging were noted during the hospital stay  Hospital course  Patient tolerated surgical procedure well and there was no complications. Patient progressed adequately through their recovery during hospital stay including PT and rehabilitation.  DVT prophylaxis was implemented POD#1  Patient was then D/C to home with [facility]  in stable condition.    Patient was instructed on the use of pain medications, the  signs and symptoms of infection, and was given our number to call should they have any questions or concerns following discharge.          Discharge Information:    and Continuing Care:   Lab Results - Pending:    None  Radiology Results - Pending: None   Discharge Instructions:    Activity:           activity with assistance.          May shower..            May not return to school/work.            May not drive.            Weight-bearing Instructions: partial weight-bearing right leg.  50% WB to the right    Nutrition/Diet:           resume normal diet    Wound Care:           Wound Site:   right hip          Change Dressin time   peel off rubber dressing on 23    Rehab Services:           Occupational Therapy Orders:   Eval and Treat ([facility])          Physical Therapy Orders:   Eval and Treat ([facility])    Home Care Certification:           Home Care Agency:    Home Team [phone]          Skilled Disciplines Ordered:   PT,  OT    Home Care Services:           Home Care Skilled Service:   Rehab (PT/OT/SP eval and treat),  wound care    Care Recommendation:           I recommend that INPATIENT care is required at::   Skilled          Estimated Stay:   Convalescent stay < 30 days    Follow Up Appointments:    Follow-Up Appointment 01:           Physician/Dept/Service:   Dr LALIT Travis          Reason for Referral:   rt hip- post op          Call to Schedule in:   3 weeks,  or s already scheduled by the office          Location:   [address] [facility]          Phone Number:   [phone]    Discharge Medications: Home Medication   rosuvastatin 40 mg oral tablet - 1 tab(s) orally once a day  carvedilol 6.25 mg oral tablet - 1 tab(s) orally 2 times a day  instructed to take AM of surgery  famotidine 20 mg oral tablet, disintegrating - 1  orally 2 times a day  levothyroxine 25 mcg (0.025 mg) oral tablet - 1 tab(s) orally once a day  alendronate  "70 mg oral tablet - 1 tab(s) orally once a week on Sundays- holding for procedure   amLODIPine 5 mg oral tablet - 1 tab(s) orally once a day  docusate sodium 100 mg oral capsule - 1 cap(s) orally 2 times a day  oxyCODONE 5 mg oral tablet - 1 tab(s) orally every 6 hours, As needed, Pain - 7-10    and take 0.5 tabs (2.5mg) for pain 4-6  aspirin 81 mg oral delayed release tablet - 1 tab(s) orally 2 times a day -for 30 days     PRN Medication   cyclobenzaprine 5 mg oral tablet - 1 tab(s) orally 3 times a day, As Needed   traMADol 50 mg oral tablet - 1 tab(s) orally 2 times a day, As Needed -   fluticasone 50 mcg/inh nasal spray - 1 spray(s) nasal once a day, As Needed  nitroglycerin 0.4 mg sublingual tablet - 1 tab(s) sublingual every 5 minutes, As Needed     DNR Status:   ·  Code Status Code Status order at time of discharge: Full Code       Electronic Signatures:  Kary Betts (APRN-CNP)  (Signed 27-Jul-2023 11:51)   Authored: Send Summary, Summary Content, Ongoing Care,  DNR Status, Note Completion      Last Updated: 27-Jul-2023 11:51 by Kary Betts ([facility]-CNP)    References:  1.  Data Referenced From \"Consult-General Internal Medicine\" 26-Jul-2023 15:57   " Home

## 2023-10-17 ENCOUNTER — NON-APPOINTMENT (OUTPATIENT)
Age: 18
End: 2023-10-17

## 2023-11-07 ENCOUNTER — NON-APPOINTMENT (OUTPATIENT)
Age: 18
End: 2023-11-07

## 2023-11-15 ENCOUNTER — EMERGENCY (EMERGENCY)
Facility: HOSPITAL | Age: 18
LOS: 0 days | Discharge: LEFT BEFORE DISCHARGE | End: 2023-11-16
Attending: EMERGENCY MEDICINE
Payer: MEDICAID

## 2023-11-15 VITALS
TEMPERATURE: 98 F | HEART RATE: 117 BPM | DIASTOLIC BLOOD PRESSURE: 68 MMHG | OXYGEN SATURATION: 99 % | WEIGHT: 154.98 LBS | SYSTOLIC BLOOD PRESSURE: 120 MMHG | RESPIRATION RATE: 18 BRPM

## 2023-11-15 DIAGNOSIS — R10.31 RIGHT LOWER QUADRANT PAIN: ICD-10-CM

## 2023-11-15 DIAGNOSIS — R11.0 NAUSEA: ICD-10-CM

## 2023-11-15 DIAGNOSIS — Z87.42 PERSONAL HISTORY OF OTHER DISEASES OF THE FEMALE GENITAL TRACT: ICD-10-CM

## 2023-11-15 DIAGNOSIS — R10.9 UNSPECIFIED ABDOMINAL PAIN: ICD-10-CM

## 2023-11-15 DIAGNOSIS — Z53.29 PROCEDURE AND TREATMENT NOT CARRIED OUT BECAUSE OF PATIENT'S DECISION FOR OTHER REASONS: ICD-10-CM

## 2023-11-15 LAB
ALBUMIN SERPL ELPH-MCNC: 4.5 G/DL — SIGNIFICANT CHANGE UP (ref 3.5–5.2)
ALBUMIN SERPL ELPH-MCNC: 4.5 G/DL — SIGNIFICANT CHANGE UP (ref 3.5–5.2)
ALP SERPL-CCNC: 55 U/L — SIGNIFICANT CHANGE UP (ref 30–115)
ALP SERPL-CCNC: 55 U/L — SIGNIFICANT CHANGE UP (ref 30–115)
ALT FLD-CCNC: 9 U/L — LOW (ref 14–37)
ALT FLD-CCNC: 9 U/L — LOW (ref 14–37)
ANION GAP SERPL CALC-SCNC: 8 MMOL/L — SIGNIFICANT CHANGE UP (ref 7–14)
ANION GAP SERPL CALC-SCNC: 8 MMOL/L — SIGNIFICANT CHANGE UP (ref 7–14)
APPEARANCE UR: CLEAR — SIGNIFICANT CHANGE UP
APPEARANCE UR: CLEAR — SIGNIFICANT CHANGE UP
AST SERPL-CCNC: 16 U/L — SIGNIFICANT CHANGE UP (ref 14–37)
AST SERPL-CCNC: 16 U/L — SIGNIFICANT CHANGE UP (ref 14–37)
BACTERIA # UR AUTO: ABNORMAL /HPF
BACTERIA # UR AUTO: ABNORMAL /HPF
BASOPHILS # BLD AUTO: 0.03 K/UL — SIGNIFICANT CHANGE UP (ref 0–0.2)
BASOPHILS # BLD AUTO: 0.03 K/UL — SIGNIFICANT CHANGE UP (ref 0–0.2)
BASOPHILS NFR BLD AUTO: 0.3 % — SIGNIFICANT CHANGE UP (ref 0–1)
BASOPHILS NFR BLD AUTO: 0.3 % — SIGNIFICANT CHANGE UP (ref 0–1)
BILIRUB SERPL-MCNC: <0.2 MG/DL — SIGNIFICANT CHANGE UP (ref 0.2–1.2)
BILIRUB SERPL-MCNC: <0.2 MG/DL — SIGNIFICANT CHANGE UP (ref 0.2–1.2)
BILIRUB UR-MCNC: NEGATIVE — SIGNIFICANT CHANGE UP
BILIRUB UR-MCNC: NEGATIVE — SIGNIFICANT CHANGE UP
BUN SERPL-MCNC: 8 MG/DL — LOW (ref 10–20)
BUN SERPL-MCNC: 8 MG/DL — LOW (ref 10–20)
CALCIUM SERPL-MCNC: 9.3 MG/DL — SIGNIFICANT CHANGE UP (ref 8.4–10.5)
CALCIUM SERPL-MCNC: 9.3 MG/DL — SIGNIFICANT CHANGE UP (ref 8.4–10.5)
CHLORIDE SERPL-SCNC: 102 MMOL/L — SIGNIFICANT CHANGE UP (ref 98–110)
CHLORIDE SERPL-SCNC: 102 MMOL/L — SIGNIFICANT CHANGE UP (ref 98–110)
CO2 SERPL-SCNC: 25 MMOL/L — SIGNIFICANT CHANGE UP (ref 17–32)
CO2 SERPL-SCNC: 25 MMOL/L — SIGNIFICANT CHANGE UP (ref 17–32)
COLOR SPEC: YELLOW — SIGNIFICANT CHANGE UP
COLOR SPEC: YELLOW — SIGNIFICANT CHANGE UP
CREAT SERPL-MCNC: 0.8 MG/DL — SIGNIFICANT CHANGE UP (ref 0.7–1.5)
CREAT SERPL-MCNC: 0.8 MG/DL — SIGNIFICANT CHANGE UP (ref 0.7–1.5)
DIFF PNL FLD: ABNORMAL
DIFF PNL FLD: ABNORMAL
EGFR: 108 ML/MIN/1.73M2 — SIGNIFICANT CHANGE UP
EGFR: 108 ML/MIN/1.73M2 — SIGNIFICANT CHANGE UP
EOSINOPHIL # BLD AUTO: 0.07 K/UL — SIGNIFICANT CHANGE UP (ref 0–0.7)
EOSINOPHIL # BLD AUTO: 0.07 K/UL — SIGNIFICANT CHANGE UP (ref 0–0.7)
EOSINOPHIL NFR BLD AUTO: 0.7 % — SIGNIFICANT CHANGE UP (ref 0–8)
EOSINOPHIL NFR BLD AUTO: 0.7 % — SIGNIFICANT CHANGE UP (ref 0–8)
EPI CELLS # UR: PRESENT
EPI CELLS # UR: PRESENT
GLUCOSE SERPL-MCNC: 88 MG/DL — SIGNIFICANT CHANGE UP (ref 70–99)
GLUCOSE SERPL-MCNC: 88 MG/DL — SIGNIFICANT CHANGE UP (ref 70–99)
GLUCOSE UR QL: NEGATIVE MG/DL — SIGNIFICANT CHANGE UP
GLUCOSE UR QL: NEGATIVE MG/DL — SIGNIFICANT CHANGE UP
HCT VFR BLD CALC: 37 % — SIGNIFICANT CHANGE UP (ref 37–47)
HCT VFR BLD CALC: 37 % — SIGNIFICANT CHANGE UP (ref 37–47)
HGB BLD-MCNC: 12.7 G/DL — SIGNIFICANT CHANGE UP (ref 12–16)
HGB BLD-MCNC: 12.7 G/DL — SIGNIFICANT CHANGE UP (ref 12–16)
IMM GRANULOCYTES NFR BLD AUTO: 0.3 % — SIGNIFICANT CHANGE UP (ref 0.1–0.3)
IMM GRANULOCYTES NFR BLD AUTO: 0.3 % — SIGNIFICANT CHANGE UP (ref 0.1–0.3)
KETONES UR-MCNC: NEGATIVE MG/DL — SIGNIFICANT CHANGE UP
KETONES UR-MCNC: NEGATIVE MG/DL — SIGNIFICANT CHANGE UP
LEUKOCYTE ESTERASE UR-ACNC: NEGATIVE — SIGNIFICANT CHANGE UP
LEUKOCYTE ESTERASE UR-ACNC: NEGATIVE — SIGNIFICANT CHANGE UP
LYMPHOCYTES # BLD AUTO: 3.03 K/UL — SIGNIFICANT CHANGE UP (ref 1.2–3.4)
LYMPHOCYTES # BLD AUTO: 3.03 K/UL — SIGNIFICANT CHANGE UP (ref 1.2–3.4)
LYMPHOCYTES # BLD AUTO: 31.9 % — SIGNIFICANT CHANGE UP (ref 20.5–51.1)
LYMPHOCYTES # BLD AUTO: 31.9 % — SIGNIFICANT CHANGE UP (ref 20.5–51.1)
MCHC RBC-ENTMCNC: 29.1 PG — SIGNIFICANT CHANGE UP (ref 27–31)
MCHC RBC-ENTMCNC: 29.1 PG — SIGNIFICANT CHANGE UP (ref 27–31)
MCHC RBC-ENTMCNC: 34.3 G/DL — SIGNIFICANT CHANGE UP (ref 32–37)
MCHC RBC-ENTMCNC: 34.3 G/DL — SIGNIFICANT CHANGE UP (ref 32–37)
MCV RBC AUTO: 84.9 FL — SIGNIFICANT CHANGE UP (ref 81–99)
MCV RBC AUTO: 84.9 FL — SIGNIFICANT CHANGE UP (ref 81–99)
MONOCYTES # BLD AUTO: 0.88 K/UL — HIGH (ref 0.1–0.6)
MONOCYTES # BLD AUTO: 0.88 K/UL — HIGH (ref 0.1–0.6)
MONOCYTES NFR BLD AUTO: 9.3 % — SIGNIFICANT CHANGE UP (ref 1.7–9.3)
MONOCYTES NFR BLD AUTO: 9.3 % — SIGNIFICANT CHANGE UP (ref 1.7–9.3)
NEUTROPHILS # BLD AUTO: 5.45 K/UL — SIGNIFICANT CHANGE UP (ref 1.4–6.5)
NEUTROPHILS # BLD AUTO: 5.45 K/UL — SIGNIFICANT CHANGE UP (ref 1.4–6.5)
NEUTROPHILS NFR BLD AUTO: 57.5 % — SIGNIFICANT CHANGE UP (ref 42.2–75.2)
NEUTROPHILS NFR BLD AUTO: 57.5 % — SIGNIFICANT CHANGE UP (ref 42.2–75.2)
NITRITE UR-MCNC: NEGATIVE — SIGNIFICANT CHANGE UP
NITRITE UR-MCNC: NEGATIVE — SIGNIFICANT CHANGE UP
NRBC # BLD: 0 /100 WBCS — SIGNIFICANT CHANGE UP (ref 0–0)
NRBC # BLD: 0 /100 WBCS — SIGNIFICANT CHANGE UP (ref 0–0)
PH UR: 7 — SIGNIFICANT CHANGE UP (ref 5–8)
PH UR: 7 — SIGNIFICANT CHANGE UP (ref 5–8)
PLATELET # BLD AUTO: 359 K/UL — SIGNIFICANT CHANGE UP (ref 130–400)
PLATELET # BLD AUTO: 359 K/UL — SIGNIFICANT CHANGE UP (ref 130–400)
PMV BLD: 9.7 FL — SIGNIFICANT CHANGE UP (ref 7.4–10.4)
PMV BLD: 9.7 FL — SIGNIFICANT CHANGE UP (ref 7.4–10.4)
POTASSIUM SERPL-MCNC: 4 MMOL/L — SIGNIFICANT CHANGE UP (ref 3.5–5)
POTASSIUM SERPL-MCNC: 4 MMOL/L — SIGNIFICANT CHANGE UP (ref 3.5–5)
POTASSIUM SERPL-SCNC: 4 MMOL/L — SIGNIFICANT CHANGE UP (ref 3.5–5)
POTASSIUM SERPL-SCNC: 4 MMOL/L — SIGNIFICANT CHANGE UP (ref 3.5–5)
PROT SERPL-MCNC: 6.8 G/DL — SIGNIFICANT CHANGE UP (ref 6–8)
PROT SERPL-MCNC: 6.8 G/DL — SIGNIFICANT CHANGE UP (ref 6–8)
PROT UR-MCNC: NEGATIVE MG/DL — SIGNIFICANT CHANGE UP
PROT UR-MCNC: NEGATIVE MG/DL — SIGNIFICANT CHANGE UP
RBC # BLD: 4.36 M/UL — SIGNIFICANT CHANGE UP (ref 4.2–5.4)
RBC # BLD: 4.36 M/UL — SIGNIFICANT CHANGE UP (ref 4.2–5.4)
RBC # FLD: 13.2 % — SIGNIFICANT CHANGE UP (ref 11.5–14.5)
RBC # FLD: 13.2 % — SIGNIFICANT CHANGE UP (ref 11.5–14.5)
RBC CASTS # UR COMP ASSIST: 7 /HPF — HIGH (ref 0–4)
RBC CASTS # UR COMP ASSIST: 7 /HPF — HIGH (ref 0–4)
SODIUM SERPL-SCNC: 135 MMOL/L — SIGNIFICANT CHANGE UP (ref 135–146)
SODIUM SERPL-SCNC: 135 MMOL/L — SIGNIFICANT CHANGE UP (ref 135–146)
SP GR SPEC: 1.02 — SIGNIFICANT CHANGE UP (ref 1–1.03)
SP GR SPEC: 1.02 — SIGNIFICANT CHANGE UP (ref 1–1.03)
UROBILINOGEN FLD QL: 0.2 MG/DL — SIGNIFICANT CHANGE UP (ref 0.2–1)
UROBILINOGEN FLD QL: 0.2 MG/DL — SIGNIFICANT CHANGE UP (ref 0.2–1)
WBC # BLD: 9.49 K/UL — SIGNIFICANT CHANGE UP (ref 4.8–10.8)
WBC # BLD: 9.49 K/UL — SIGNIFICANT CHANGE UP (ref 4.8–10.8)
WBC # FLD AUTO: 9.49 K/UL — SIGNIFICANT CHANGE UP (ref 4.8–10.8)
WBC # FLD AUTO: 9.49 K/UL — SIGNIFICANT CHANGE UP (ref 4.8–10.8)
WBC UR QL: 1 /HPF — SIGNIFICANT CHANGE UP (ref 0–5)
WBC UR QL: 1 /HPF — SIGNIFICANT CHANGE UP (ref 0–5)

## 2023-11-15 PROCEDURE — 81001 URINALYSIS AUTO W/SCOPE: CPT

## 2023-11-15 PROCEDURE — 99284 EMERGENCY DEPT VISIT MOD MDM: CPT | Mod: 25

## 2023-11-15 PROCEDURE — 76830 TRANSVAGINAL US NON-OB: CPT

## 2023-11-15 PROCEDURE — 99284 EMERGENCY DEPT VISIT MOD MDM: CPT

## 2023-11-15 PROCEDURE — 87086 URINE CULTURE/COLONY COUNT: CPT

## 2023-11-15 PROCEDURE — 76830 TRANSVAGINAL US NON-OB: CPT | Mod: 26

## 2023-11-15 PROCEDURE — 80053 COMPREHEN METABOLIC PANEL: CPT

## 2023-11-15 PROCEDURE — 85025 COMPLETE CBC W/AUTO DIFF WBC: CPT

## 2023-11-15 PROCEDURE — 36415 COLL VENOUS BLD VENIPUNCTURE: CPT

## 2023-11-15 RX ORDER — IOHEXOL 300 MG/ML
30 INJECTION, SOLUTION INTRAVENOUS ONCE
Refills: 0 | Status: COMPLETED | OUTPATIENT
Start: 2023-11-15 | End: 2023-11-15

## 2023-11-15 RX ORDER — SODIUM CHLORIDE 9 MG/ML
1000 INJECTION INTRAMUSCULAR; INTRAVENOUS; SUBCUTANEOUS ONCE
Refills: 0 | Status: COMPLETED | OUTPATIENT
Start: 2023-11-15 | End: 2023-11-15

## 2023-11-15 RX ADMIN — SODIUM CHLORIDE 1000 MILLILITER(S): 9 INJECTION INTRAMUSCULAR; INTRAVENOUS; SUBCUTANEOUS at 22:32

## 2023-11-15 RX ADMIN — IOHEXOL 30 MILLILITER(S): 300 INJECTION, SOLUTION INTRAVENOUS at 23:17

## 2023-11-15 NOTE — ED PROVIDER NOTE - CLINICAL SUMMARY MEDICAL DECISION MAKING FREE TEXT BOX
Received sign out from Dr. Klerman -- patient presented with R sided abdominal/pelvic pain, has hx of ovarian cyst and states this feels similar. Labs and US done, no signs of cyst, labs unremarkable.    Given persistent pain and location, plan for CT scan to ro appendicitis. Patient refused CT imaging. Discussed that her pain is concerning for appy especially given negative US and patient states that she is "sure this is not appendicitis" and that she "just came here to make sure it wasn't a cyst."    Patient is AAOx3, understands risks of leaving, specifically ruptured appendicitis leading to sepsis, coma, loss of ability to take care of ADLs, death -- still prefers to leave with PMD follow up.     Discussed return precautions and that she is welcome to return at any time.

## 2023-11-15 NOTE — ED PROVIDER NOTE - PATIENT PORTAL LINK FT
You can access the FollowMyHealth Patient Portal offered by [facility] by registering at the following website: [email or web address]. By joining [employer]’s FollowMyHealth portal, you will also be able to view your health information using other applications (apps) compatible with our system.

## 2023-11-15 NOTE — ED PROVIDER NOTE - NSFOLLOWUPINSTRUCTIONS_ED_ALL_ED_FT
YOU ARE LEAVING AGAINST MEDICAL ADVICE. THOUGH YOUR LABS AND ULTRASOUND WERE NORMAL, THEY DO NOT RULE OUT APPENDICITIS AND YOUR EXAM IS CONCERNING FOR APPENDICITIS.    PLEASE FOLLOW UP WITH YOUR DOCTOR WITHIN 48 HOURS. PLEASE RETURN IMMEDIATELY IF YOU CHANGE YOUR MIND OR IF YOU DEVELOP NEW OR WORSENING SYMPTOMS.     Abdominal Pain    Many things can cause abdominal pain. Usually, abdominal pain is not caused by a disease and will improve without treatment. Your health care provider will do a physical exam and possibly order blood tests and imaging to help determine the seriousness of your pain. However, in many cases, no cause may be found and you may need further testing as an outpatient. Monitor your abdominal pain for any changes.     SEEK IMMEDIATE MEDICAL CARE IF YOU HAVE THE FOLLOWING SYMPTOMS: worsening abdominal pain, vomiting, diarrhea, inability to have bowel movements or pass gas, black or bloody stool, fever accompanying chest pain or back pain, or dizziness/lightheadedness.

## 2023-11-15 NOTE — ED PROVIDER NOTE - ATTENDING APP SHARED VISIT CONTRIBUTION OF CARE
18yF hx R ovarian cyst p/w RLQ/R pelvic pain - pt reports severe pain that started tonight ~1hr prior to arrival, associated w/ nausea - says it feels like her prior episode of ruptured ovarian cyst.  No fever.  LMP late October.

## 2023-11-16 PROBLEM — K52.9 NONINFECTIVE GASTROENTERITIS AND COLITIS, UNSPECIFIED: Chronic | Status: ACTIVE | Noted: 2023-09-08

## 2023-11-18 LAB
CULTURE RESULTS: SIGNIFICANT CHANGE UP
CULTURE RESULTS: SIGNIFICANT CHANGE UP
SPECIMEN SOURCE: SIGNIFICANT CHANGE UP
SPECIMEN SOURCE: SIGNIFICANT CHANGE UP

## 2024-01-16 ENCOUNTER — NON-APPOINTMENT (OUTPATIENT)
Age: 19
End: 2024-01-16

## 2024-04-03 ENCOUNTER — NON-APPOINTMENT (OUTPATIENT)
Age: 19
End: 2024-04-03

## 2024-06-24 NOTE — ED PROVIDER NOTE - NSICDXPASTMEDICALHX_GEN_ALL_CORE_FT
Counseling and Referral of Other Preventative  (Italic type indicates deductible and co-insurance are waived)    Patient Name: Rosalia Fernández  Today's Date: 6/24/2024    Health Maintenance       Date Due Completion Date    COVID-19 Vaccine (7 - 2023-24 season) 02/07/2024 10/7/2023    Hemoglobin A1c (Prediabetes) 03/12/2025 3/12/2024    DEXA Scan 09/30/2025 9/30/2022    TETANUS VACCINE 05/16/2029 5/16/2019    Lipid Panel 05/28/2029 5/28/2024    Colorectal Cancer Screening 06/29/2031 6/29/2021    Override on 4/10/2018: Done (tubular adenoma)    Override on 3/1/2013: Done        No orders of the defined types were placed in this encounter.    The following information is provided to all patients.  This information is to help you find resources for any of the problems found today that may be affecting your health:                  Living healthy guide: www.[facility].[place].[facility]      Understanding Diabetes: www.diabetes.org      Eating healthy: www.cdc.gov/healthyweight      [facility] [place] [place] home safety checklist: www.cdc.gov/steadi/patient.html      Agency on Aging: www.goea.[place].[facility]      Alcoholics anonymous (AA): www.aa.org      Physical Activity: www.cihco.nih.gov/vh1pnyj      Tobacco use: www.quitwithusla.org         
PAST MEDICAL HISTORY:  History of ovarian cyst     No pertinent past medical history

## 2024-09-27 ENCOUNTER — NON-APPOINTMENT (OUTPATIENT)
Age: 19
End: 2024-09-27

## 2024-10-18 ENCOUNTER — NON-APPOINTMENT (OUTPATIENT)
Age: 19
End: 2024-10-18

## 2024-12-11 ENCOUNTER — NON-APPOINTMENT (OUTPATIENT)
Age: 19
End: 2024-12-11

## 2024-12-13 ENCOUNTER — NON-APPOINTMENT (OUTPATIENT)
Age: 19
End: 2024-12-13

## 2025-01-30 ENCOUNTER — NON-APPOINTMENT (OUTPATIENT)
Age: 20
End: 2025-01-30

## 2025-02-12 ENCOUNTER — NON-APPOINTMENT (OUTPATIENT)
Age: 20
End: 2025-02-12

## 2025-03-06 ENCOUNTER — APPOINTMENT (OUTPATIENT)
Dept: OTOLARYNGOLOGY | Facility: CLINIC | Age: 20
End: 2025-03-06

## 2025-03-21 NOTE — PRE-OP CHECKLIST - NSWEIGHTCALCTOOLDRUG_GEN_A_CORE
A percutaneous stick to the right internal jugular vein was performed. Ultrasound guidance was used to obtain access.
 used
 used

## 2025-06-08 ENCOUNTER — NON-APPOINTMENT (OUTPATIENT)
Age: 20
End: 2025-06-08

## 2025-07-31 ENCOUNTER — EMERGENCY (EMERGENCY)
Facility: HOSPITAL | Age: 20
LOS: 0 days | Discharge: HOME | End: 2025-07-31
Attending: STUDENT IN AN ORGANIZED HEALTH CARE EDUCATION/TRAINING PROGRAM
Payer: MEDICAID

## 2025-07-31 VITALS
RESPIRATION RATE: 18 BRPM | OXYGEN SATURATION: 97 % | TEMPERATURE: 98 F | SYSTOLIC BLOOD PRESSURE: 105 MMHG | HEART RATE: 69 BPM | DIASTOLIC BLOOD PRESSURE: 66 MMHG

## 2025-07-31 VITALS
TEMPERATURE: 98 F | HEIGHT: 61 IN | DIASTOLIC BLOOD PRESSURE: 71 MMHG | WEIGHT: 154.98 LBS | HEART RATE: 84 BPM | SYSTOLIC BLOOD PRESSURE: 103 MMHG | RESPIRATION RATE: 18 BRPM | OXYGEN SATURATION: 96 %

## 2025-07-31 DIAGNOSIS — R51.9 HEADACHE, UNSPECIFIED: ICD-10-CM

## 2025-07-31 DIAGNOSIS — R11.2 NAUSEA WITH VOMITING, UNSPECIFIED: ICD-10-CM

## 2025-07-31 LAB
ALBUMIN SERPL ELPH-MCNC: 4.6 G/DL — SIGNIFICANT CHANGE UP (ref 3.5–5.2)
ALP SERPL-CCNC: 50 U/L — SIGNIFICANT CHANGE UP (ref 30–115)
ALT FLD-CCNC: 12 U/L — SIGNIFICANT CHANGE UP (ref 0–41)
ANION GAP SERPL CALC-SCNC: 9 MMOL/L — SIGNIFICANT CHANGE UP (ref 7–14)
AST SERPL-CCNC: 14 U/L — SIGNIFICANT CHANGE UP (ref 0–41)
BASOPHILS # BLD AUTO: 0.02 K/UL — SIGNIFICANT CHANGE UP (ref 0–0.2)
BASOPHILS NFR BLD AUTO: 0.3 % — SIGNIFICANT CHANGE UP (ref 0–2)
BILIRUB SERPL-MCNC: 0.5 MG/DL — SIGNIFICANT CHANGE UP (ref 0.2–1.2)
BUN SERPL-MCNC: 5 MG/DL — LOW (ref 10–20)
CALCIUM SERPL-MCNC: 9.7 MG/DL — SIGNIFICANT CHANGE UP (ref 8.4–10.5)
CHLORIDE SERPL-SCNC: 104 MMOL/L — SIGNIFICANT CHANGE UP (ref 98–110)
CO2 SERPL-SCNC: 25 MMOL/L — SIGNIFICANT CHANGE UP (ref 17–32)
CREAT SERPL-MCNC: 0.7 MG/DL — SIGNIFICANT CHANGE UP (ref 0.7–1.5)
EGFR: 125 ML/MIN/1.73M2 — SIGNIFICANT CHANGE UP
EGFR: 125 ML/MIN/1.73M2 — SIGNIFICANT CHANGE UP
EOSINOPHIL # BLD AUTO: 0.03 K/UL — SIGNIFICANT CHANGE UP (ref 0–0.5)
EOSINOPHIL NFR BLD AUTO: 0.5 % — SIGNIFICANT CHANGE UP (ref 0–6)
GLUCOSE SERPL-MCNC: 88 MG/DL — SIGNIFICANT CHANGE UP (ref 70–99)
HCG SERPL QL: NEGATIVE — SIGNIFICANT CHANGE UP
HCT VFR BLD CALC: 40.4 % — SIGNIFICANT CHANGE UP (ref 34.5–45)
HGB BLD-MCNC: 13.3 G/DL — SIGNIFICANT CHANGE UP (ref 11.5–15.5)
IMM GRANULOCYTES # BLD AUTO: 0.01 K/UL — SIGNIFICANT CHANGE UP (ref 0–0.07)
IMM GRANULOCYTES NFR BLD AUTO: 0.2 % — SIGNIFICANT CHANGE UP (ref 0–0.9)
LYMPHOCYTES # BLD AUTO: 1.55 K/UL — SIGNIFICANT CHANGE UP (ref 1–3.3)
LYMPHOCYTES NFR BLD AUTO: 26.1 % — SIGNIFICANT CHANGE UP (ref 13–44)
MAGNESIUM SERPL-MCNC: 2 MG/DL — SIGNIFICANT CHANGE UP (ref 1.8–2.4)
MCHC RBC-ENTMCNC: 29.8 PG — SIGNIFICANT CHANGE UP (ref 27–34)
MCHC RBC-ENTMCNC: 32.9 G/DL — SIGNIFICANT CHANGE UP (ref 32–36)
MCV RBC AUTO: 90.4 FL — SIGNIFICANT CHANGE UP (ref 80–100)
MONOCYTES # BLD AUTO: 0.52 K/UL — SIGNIFICANT CHANGE UP (ref 0–0.9)
MONOCYTES NFR BLD AUTO: 8.7 % — SIGNIFICANT CHANGE UP (ref 2–14)
NEUTROPHILS # BLD AUTO: 3.82 K/UL — SIGNIFICANT CHANGE UP (ref 1.8–7.4)
NEUTROPHILS NFR BLD AUTO: 64.2 % — SIGNIFICANT CHANGE UP (ref 43–77)
NRBC # BLD AUTO: 0 K/UL — SIGNIFICANT CHANGE UP (ref 0–0)
NRBC # FLD: 0 K/UL — SIGNIFICANT CHANGE UP (ref 0–0)
NRBC BLD AUTO-RTO: 0 /100 WBCS — SIGNIFICANT CHANGE UP (ref 0–0)
PLATELET # BLD AUTO: 290 K/UL — SIGNIFICANT CHANGE UP (ref 150–400)
PMV BLD: 10.3 FL — SIGNIFICANT CHANGE UP (ref 7–13)
POTASSIUM SERPL-MCNC: 4.3 MMOL/L — SIGNIFICANT CHANGE UP (ref 3.5–5)
POTASSIUM SERPL-SCNC: 4.3 MMOL/L — SIGNIFICANT CHANGE UP (ref 3.5–5)
PROT SERPL-MCNC: 7 G/DL — SIGNIFICANT CHANGE UP (ref 6–8)
RBC # BLD: 4.47 M/UL — SIGNIFICANT CHANGE UP (ref 3.8–5.2)
RBC # FLD: 12 % — SIGNIFICANT CHANGE UP (ref 10.3–14.5)
SODIUM SERPL-SCNC: 138 MMOL/L — SIGNIFICANT CHANGE UP (ref 135–146)
WBC # BLD: 5.95 K/UL — SIGNIFICANT CHANGE UP (ref 3.8–10.5)
WBC # FLD AUTO: 5.95 K/UL — SIGNIFICANT CHANGE UP (ref 3.8–10.5)

## 2025-07-31 PROCEDURE — 99285 EMERGENCY DEPT VISIT HI MDM: CPT

## 2025-07-31 PROCEDURE — 93010 ELECTROCARDIOGRAM REPORT: CPT

## 2025-07-31 PROCEDURE — 96374 THER/PROPH/DIAG INJ IV PUSH: CPT

## 2025-07-31 PROCEDURE — 80053 COMPREHEN METABOLIC PANEL: CPT

## 2025-07-31 PROCEDURE — 84703 CHORIONIC GONADOTROPIN ASSAY: CPT

## 2025-07-31 PROCEDURE — 83735 ASSAY OF MAGNESIUM: CPT

## 2025-07-31 PROCEDURE — 99285 EMERGENCY DEPT VISIT HI MDM: CPT | Mod: 25

## 2025-07-31 PROCEDURE — 70450 CT HEAD/BRAIN W/O DYE: CPT | Mod: 26

## 2025-07-31 PROCEDURE — 36415 COLL VENOUS BLD VENIPUNCTURE: CPT

## 2025-07-31 PROCEDURE — 85025 COMPLETE CBC W/AUTO DIFF WBC: CPT

## 2025-07-31 PROCEDURE — 93005 ELECTROCARDIOGRAM TRACING: CPT

## 2025-07-31 PROCEDURE — 96375 TX/PRO/DX INJ NEW DRUG ADDON: CPT

## 2025-07-31 PROCEDURE — 70450 CT HEAD/BRAIN W/O DYE: CPT

## 2025-07-31 RX ORDER — MAGNESIUM SULFATE 500 MG/ML
2 SYRINGE (ML) INJECTION ONCE
Refills: 0 | Status: COMPLETED | OUTPATIENT
Start: 2025-07-31 | End: 2025-07-31

## 2025-07-31 RX ORDER — KETOROLAC TROMETHAMINE 30 MG/ML
15 INJECTION, SOLUTION INTRAMUSCULAR; INTRAVENOUS ONCE
Refills: 0 | Status: DISCONTINUED | OUTPATIENT
Start: 2025-07-31 | End: 2025-07-31

## 2025-07-31 RX ORDER — METOCLOPRAMIDE HCL 10 MG
10 TABLET ORAL ONCE
Refills: 0 | Status: COMPLETED | OUTPATIENT
Start: 2025-07-31 | End: 2025-07-31

## 2025-07-31 RX ORDER — SODIUM CHLORIDE 9 G/1000ML
1000 INJECTION, SOLUTION INTRAVENOUS ONCE
Refills: 0 | Status: COMPLETED | OUTPATIENT
Start: 2025-07-31 | End: 2025-07-31

## 2025-07-31 RX ADMIN — SODIUM CHLORIDE 1000 MILLILITER(S): 9 INJECTION, SOLUTION INTRAVENOUS at 17:04

## 2025-07-31 RX ADMIN — Medication 150 GRAM(S): at 17:31

## 2025-07-31 RX ADMIN — Medication 104 MILLIGRAM(S): at 17:31

## 2025-07-31 RX ADMIN — KETOROLAC TROMETHAMINE 15 MILLIGRAM(S): 30 INJECTION, SOLUTION INTRAMUSCULAR; INTRAVENOUS at 17:31

## 2025-07-31 NOTE — ED PROVIDER NOTE - OBJECTIVE STATEMENT
Patient sent by PMD for CT and eval , C/o HA past 2 days, Started with jagged line in visual field yesterday followed by HA with N/V, vision sx resolved, HA persisted, fainted last night and again today in MD s office, no Sz activity, no chest pain,

## 2025-07-31 NOTE — ED PROVIDER NOTE - CLINICAL SUMMARY MEDICAL DECISION MAKING FREE TEXT BOX
Yes Patient presented for evaluation of headache.  Labs grossly unremarkable.  CT head noted to have no acute abnormalities.  EKG noted normal sinus rhythm with no ST segment changes.  Patient also fainted last night and again today however upon further clarification she was not completely unconscious.  No seizure-like activity.  Otherwise neurologically intact at this time.  Patient felt better during ED stay.  Will discharge to close follow-up outpatient with PMD.  Return precautions explained to patient

## 2025-07-31 NOTE — ED PROVIDER NOTE - PATIENT PORTAL LINK FT
You can access the FollowMyHealth Patient Portal offered by [facility] by registering at the following website: [email or web address]. By joining [employer]’s FollowMyHealth portal, you will also be able to view your health information using other applications (apps) compatible with our system.

## 2025-07-31 NOTE — ED ADULT NURSE NOTE - CAS EDN DISCHARGE ASSESSMENT
"-- DO NOT REPLY / DO NOT REPLY ALL --  -- Message is from the [employer]--    COVID-19 Universal Screening: N/A - Not about scheduling    General Patient Message      Reason for Call: the office is calling the patient is having a procedure on 12/22/20 (cataract surgery) they need the notes and the clearance form signed by the doctor from the 12/11/20 visit please fax: [phone] or [phone]    Caller Information       Type Contact Phone    12/16/2020 11:06 AM CST Phone (Incoming) claudia  [phone]     [facility]           Alternative phone [phone]      Turnaround time given to caller: ""This message will be sent to [facility] Provider's name]. The clinical team will fulfill your request as soon as they review your message. \""    " Alert and oriented to person, place and time/Patient baseline mental status/Awake